# Patient Record
Sex: FEMALE | Race: OTHER | NOT HISPANIC OR LATINO | Employment: FULL TIME | URBAN - METROPOLITAN AREA
[De-identification: names, ages, dates, MRNs, and addresses within clinical notes are randomized per-mention and may not be internally consistent; named-entity substitution may affect disease eponyms.]

---

## 2017-06-13 ENCOUNTER — TRANSCRIBE ORDERS (OUTPATIENT)
Dept: ADMINISTRATIVE | Facility: HOSPITAL | Age: 72
End: 2017-06-13

## 2017-06-13 DIAGNOSIS — K86.3 CYST AND PSEUDOCYST OF PANCREAS: Primary | ICD-10-CM

## 2017-06-13 DIAGNOSIS — K86.2 CYST AND PSEUDOCYST OF PANCREAS: Primary | ICD-10-CM

## 2017-07-05 ENCOUNTER — TRANSCRIBE ORDERS (OUTPATIENT)
Dept: ADMINISTRATIVE | Age: 72
End: 2017-07-05

## 2017-07-05 ENCOUNTER — APPOINTMENT (OUTPATIENT)
Dept: LAB | Age: 72
End: 2017-07-05
Payer: COMMERCIAL

## 2017-07-05 DIAGNOSIS — K86.2 CYST AND PSEUDOCYST OF PANCREAS: ICD-10-CM

## 2017-07-05 DIAGNOSIS — K86.2 CYST AND PSEUDOCYST OF PANCREAS: Primary | ICD-10-CM

## 2017-07-05 DIAGNOSIS — K86.3 CYST AND PSEUDOCYST OF PANCREAS: ICD-10-CM

## 2017-07-05 DIAGNOSIS — K86.3 CYST AND PSEUDOCYST OF PANCREAS: Primary | ICD-10-CM

## 2017-07-05 LAB
ALBUMIN SERPL BCP-MCNC: 3.7 G/DL (ref 3.5–5)
ALP SERPL-CCNC: 83 U/L (ref 46–116)
ALT SERPL W P-5'-P-CCNC: 24 U/L (ref 12–78)
ANION GAP SERPL CALCULATED.3IONS-SCNC: 7 MMOL/L (ref 4–13)
AST SERPL W P-5'-P-CCNC: 30 U/L (ref 5–45)
BASOPHILS # BLD AUTO: 0.04 THOUSANDS/ΜL (ref 0–0.1)
BASOPHILS NFR BLD AUTO: 1 % (ref 0–1)
BILIRUB SERPL-MCNC: 0.6 MG/DL (ref 0.2–1)
BILIRUB UR QL STRIP: NEGATIVE
BUN SERPL-MCNC: 9 MG/DL (ref 5–25)
CALCIUM SERPL-MCNC: 9.1 MG/DL (ref 8.3–10.1)
CHLORIDE SERPL-SCNC: 104 MMOL/L (ref 100–108)
CLARITY UR: CLEAR
CO2 SERPL-SCNC: 27 MMOL/L (ref 21–32)
COLOR UR: YELLOW
CREAT SERPL-MCNC: 0.59 MG/DL (ref 0.6–1.3)
EOSINOPHIL # BLD AUTO: 0.08 THOUSAND/ΜL (ref 0–0.61)
EOSINOPHIL NFR BLD AUTO: 1 % (ref 0–6)
ERYTHROCYTE [DISTWIDTH] IN BLOOD BY AUTOMATED COUNT: 13.8 % (ref 11.6–15.1)
GFR SERPL CREATININE-BSD FRML MDRD: >60 ML/MIN/1.73SQ M
GLUCOSE P FAST SERPL-MCNC: 82 MG/DL (ref 65–99)
GLUCOSE UR STRIP-MCNC: NEGATIVE MG/DL
HCT VFR BLD AUTO: 37.3 % (ref 34.8–46.1)
HGB BLD-MCNC: 12.2 G/DL (ref 11.5–15.4)
HGB UR QL STRIP.AUTO: NEGATIVE
KETONES UR STRIP-MCNC: ABNORMAL MG/DL
LEUKOCYTE ESTERASE UR QL STRIP: NEGATIVE
LIPASE SERPL-CCNC: 804 U/L (ref 73–393)
LYMPHOCYTES # BLD AUTO: 1.94 THOUSANDS/ΜL (ref 0.6–4.47)
LYMPHOCYTES NFR BLD AUTO: 26 % (ref 14–44)
MCH RBC QN AUTO: 29.5 PG (ref 26.8–34.3)
MCHC RBC AUTO-ENTMCNC: 32.7 G/DL (ref 31.4–37.4)
MCV RBC AUTO: 90 FL (ref 82–98)
MONOCYTES # BLD AUTO: 0.57 THOUSAND/ΜL (ref 0.17–1.22)
MONOCYTES NFR BLD AUTO: 8 % (ref 4–12)
NEUTROPHILS # BLD AUTO: 4.83 THOUSANDS/ΜL (ref 1.85–7.62)
NEUTS SEG NFR BLD AUTO: 64 % (ref 43–75)
NITRITE UR QL STRIP: NEGATIVE
NRBC BLD AUTO-RTO: 0 /100 WBCS
PH UR STRIP.AUTO: 6 [PH] (ref 4.5–8)
PLATELET # BLD AUTO: 269 THOUSANDS/UL (ref 149–390)
PMV BLD AUTO: 10.1 FL (ref 8.9–12.7)
POTASSIUM SERPL-SCNC: 4.7 MMOL/L (ref 3.5–5.3)
PROT SERPL-MCNC: 7.2 G/DL (ref 6.4–8.2)
PROT UR STRIP-MCNC: NEGATIVE MG/DL
RBC # BLD AUTO: 4.13 MILLION/UL (ref 3.81–5.12)
SODIUM SERPL-SCNC: 138 MMOL/L (ref 136–145)
SP GR UR STRIP.AUTO: 1.01 (ref 1–1.03)
UROBILINOGEN UR QL STRIP.AUTO: 0.2 E.U./DL
WBC # BLD AUTO: 7.48 THOUSAND/UL (ref 4.31–10.16)

## 2017-07-05 PROCEDURE — 85025 COMPLETE CBC W/AUTO DIFF WBC: CPT

## 2017-07-05 PROCEDURE — 83690 ASSAY OF LIPASE: CPT

## 2017-07-05 PROCEDURE — 80053 COMPREHEN METABOLIC PANEL: CPT

## 2017-07-05 PROCEDURE — 81003 URINALYSIS AUTO W/O SCOPE: CPT | Performed by: SURGERY

## 2017-07-05 PROCEDURE — 36415 COLL VENOUS BLD VENIPUNCTURE: CPT

## 2017-07-12 ENCOUNTER — HOSPITAL ENCOUNTER (OUTPATIENT)
Dept: RADIOLOGY | Age: 72
Discharge: HOME | End: 2017-07-12
Payer: COMMERCIAL

## 2017-07-12 DIAGNOSIS — K86.3 CYST AND PSEUDOCYST OF PANCREAS: ICD-10-CM

## 2017-07-12 DIAGNOSIS — K86.2 CYST AND PSEUDOCYST OF PANCREAS: ICD-10-CM

## 2017-07-12 PROCEDURE — 74177 CT ABD & PELVIS W/CONTRAST: CPT

## 2017-07-12 RX ADMIN — IOHEXOL 100 ML: 350 INJECTION, SOLUTION INTRAVENOUS at 13:08

## 2017-07-26 ENCOUNTER — TRANSCRIBE ORDERS (OUTPATIENT)
Dept: ADMINISTRATIVE | Age: 72
End: 2017-07-26

## 2017-07-26 ENCOUNTER — APPOINTMENT (OUTPATIENT)
Dept: RADIOLOGY | Age: 72
End: 2017-07-26
Payer: COMMERCIAL

## 2017-07-26 DIAGNOSIS — T18.4XXD: ICD-10-CM

## 2017-07-26 DIAGNOSIS — T18.4XXD: Primary | ICD-10-CM

## 2017-07-26 PROCEDURE — 74000 HB X-RAY EXAM OF ABDOMEN (SINGLE ANTEROPOSTERIOR VIEW): CPT

## 2017-11-07 ENCOUNTER — TRANSCRIBE ORDERS (OUTPATIENT)
Dept: ADMINISTRATIVE | Facility: HOSPITAL | Age: 72
End: 2017-11-07

## 2017-11-07 DIAGNOSIS — Z13.820 SCREENING FOR OSTEOPOROSIS: ICD-10-CM

## 2017-11-07 DIAGNOSIS — Z12.31 ENCOUNTER FOR SCREENING MAMMOGRAM FOR MALIGNANT NEOPLASM OF BREAST: Primary | ICD-10-CM

## 2017-12-08 ENCOUNTER — GENERIC CONVERSION - ENCOUNTER (OUTPATIENT)
Dept: OTHER | Facility: OTHER | Age: 72
End: 2017-12-08

## 2017-12-08 ENCOUNTER — HOSPITAL ENCOUNTER (OUTPATIENT)
Dept: RADIOLOGY | Age: 72
Discharge: HOME | End: 2017-12-08
Payer: COMMERCIAL

## 2017-12-08 DIAGNOSIS — Z13.820 SCREENING FOR OSTEOPOROSIS: ICD-10-CM

## 2017-12-08 DIAGNOSIS — Z12.31 ENCOUNTER FOR SCREENING MAMMOGRAM FOR MALIGNANT NEOPLASM OF BREAST: ICD-10-CM

## 2017-12-08 PROCEDURE — G0202 SCR MAMMO BI INCL CAD: HCPCS

## 2017-12-08 PROCEDURE — 77080 DXA BONE DENSITY AXIAL: CPT

## 2021-02-13 ENCOUNTER — IMMUNIZATIONS (OUTPATIENT)
Dept: FAMILY MEDICINE CLINIC | Facility: HOSPITAL | Age: 76
End: 2021-02-13

## 2021-02-13 DIAGNOSIS — Z23 ENCOUNTER FOR IMMUNIZATION: Primary | ICD-10-CM

## 2021-02-13 PROCEDURE — 91300 SARS-COV-2 / COVID-19 MRNA VACCINE (PFIZER-BIONTECH) 30 MCG: CPT

## 2021-02-13 PROCEDURE — 0001A SARS-COV-2 / COVID-19 MRNA VACCINE (PFIZER-BIONTECH) 30 MCG: CPT

## 2021-03-06 ENCOUNTER — IMMUNIZATIONS (OUTPATIENT)
Dept: FAMILY MEDICINE CLINIC | Facility: HOSPITAL | Age: 76
End: 2021-03-06

## 2021-03-06 DIAGNOSIS — Z23 ENCOUNTER FOR IMMUNIZATION: Primary | ICD-10-CM

## 2021-03-06 PROCEDURE — 0002A SARS-COV-2 / COVID-19 MRNA VACCINE (PFIZER-BIONTECH) 30 MCG: CPT

## 2021-03-06 PROCEDURE — 91300 SARS-COV-2 / COVID-19 MRNA VACCINE (PFIZER-BIONTECH) 30 MCG: CPT

## 2022-01-23 ENCOUNTER — IMMUNIZATIONS (OUTPATIENT)
Dept: FAMILY MEDICINE CLINIC | Facility: HOSPITAL | Age: 77
End: 2022-01-23

## 2022-01-23 DIAGNOSIS — Z23 ENCOUNTER FOR IMMUNIZATION: Primary | ICD-10-CM

## 2022-01-23 PROCEDURE — 0001A COVID-19 PFIZER VACC 0.3 ML: CPT

## 2022-01-23 PROCEDURE — 91300 COVID-19 PFIZER VACC 0.3 ML: CPT

## 2022-03-06 ENCOUNTER — APPOINTMENT (EMERGENCY)
Dept: RADIOLOGY | Facility: HOSPITAL | Age: 77
DRG: 439 | End: 2022-03-06
Payer: COMMERCIAL

## 2022-03-06 ENCOUNTER — APPOINTMENT (EMERGENCY)
Dept: CT IMAGING | Facility: HOSPITAL | Age: 77
DRG: 439 | End: 2022-03-06
Payer: COMMERCIAL

## 2022-03-06 ENCOUNTER — APPOINTMENT (INPATIENT)
Dept: MRI IMAGING | Facility: HOSPITAL | Age: 77
DRG: 439 | End: 2022-03-06
Payer: COMMERCIAL

## 2022-03-06 ENCOUNTER — HOSPITAL ENCOUNTER (INPATIENT)
Facility: HOSPITAL | Age: 77
LOS: 2 days | Discharge: HOME | DRG: 439 | End: 2022-03-08
Attending: EMERGENCY MEDICINE | Admitting: INTERNAL MEDICINE
Payer: COMMERCIAL

## 2022-03-06 DIAGNOSIS — K80.20 GALL STONES: ICD-10-CM

## 2022-03-06 DIAGNOSIS — K85.20 ALCOHOL-INDUCED ACUTE PANCREATITIS WITHOUT INFECTION OR NECROSIS: ICD-10-CM

## 2022-03-06 DIAGNOSIS — K85.90 ACUTE PANCREATITIS: Primary | ICD-10-CM

## 2022-03-06 PROBLEM — K21.9 GASTROESOPHAGEAL REFLUX DISEASE: Status: ACTIVE | Noted: 2022-03-06

## 2022-03-06 PROBLEM — R65.10 SIRS (SYSTEMIC INFLAMMATORY RESPONSE SYNDROME) (HCC): Status: ACTIVE | Noted: 2022-03-06

## 2022-03-06 PROBLEM — I10 PRIMARY HYPERTENSION: Chronic | Status: ACTIVE | Noted: 2022-03-06

## 2022-03-06 LAB
2HR DELTA HS TROPONIN: 0 NG/L
ALBUMIN SERPL BCP-MCNC: 4 G/DL (ref 3.5–5)
ALP SERPL-CCNC: 116 U/L (ref 46–116)
ALT SERPL W P-5'-P-CCNC: 28 U/L (ref 12–78)
ANION GAP SERPL CALCULATED.3IONS-SCNC: 11 MMOL/L (ref 4–13)
APTT PPP: 23 SECONDS (ref 23–37)
AST SERPL W P-5'-P-CCNC: 46 U/L (ref 5–45)
ATRIAL RATE: 83 BPM
BASOPHILS # BLD MANUAL: 0 THOUSAND/UL (ref 0–0.1)
BASOPHILS NFR MAR MANUAL: 0 % (ref 0–1)
BILIRUB SERPL-MCNC: 0.75 MG/DL (ref 0.2–1)
BILIRUB UR QL STRIP: NEGATIVE
BUN SERPL-MCNC: 10 MG/DL (ref 5–25)
CALCIUM SERPL-MCNC: 9.2 MG/DL (ref 8.3–10.1)
CARDIAC TROPONIN I PNL SERPL HS: 2 NG/L
CARDIAC TROPONIN I PNL SERPL HS: 2 NG/L
CHLORIDE SERPL-SCNC: 101 MMOL/L (ref 100–108)
CLARITY UR: CLEAR
CO2 SERPL-SCNC: 26 MMOL/L (ref 21–32)
COLOR UR: YELLOW
CREAT SERPL-MCNC: 0.8 MG/DL (ref 0.6–1.3)
D DIMER PPP FEU-MCNC: 0.75 UG/ML FEU
EOSINOPHIL # BLD MANUAL: 0 THOUSAND/UL (ref 0–0.4)
EOSINOPHIL NFR BLD MANUAL: 0 % (ref 0–6)
ERYTHROCYTE [DISTWIDTH] IN BLOOD BY AUTOMATED COUNT: 13.3 % (ref 11.6–15.1)
GFR SERPL CREATININE-BSD FRML MDRD: 70 ML/MIN/1.73SQ M
GLUCOSE SERPL-MCNC: 140 MG/DL (ref 65–140)
GLUCOSE UR STRIP-MCNC: NEGATIVE MG/DL
HCT VFR BLD AUTO: 43.8 % (ref 34.8–46.1)
HGB BLD-MCNC: 14.9 G/DL (ref 11.5–15.4)
HGB UR QL STRIP.AUTO: NEGATIVE
INR PPP: 0.96 (ref 0.84–1.19)
KETONES UR STRIP-MCNC: ABNORMAL MG/DL
LACTATE SERPL-SCNC: 2 MMOL/L (ref 0.5–2)
LEUKOCYTE ESTERASE UR QL STRIP: NEGATIVE
LIPASE SERPL-CCNC: ABNORMAL U/L (ref 73–393)
LYMPHOCYTES # BLD AUTO: 0.5 THOUSAND/UL (ref 0.6–4.47)
LYMPHOCYTES # BLD AUTO: 3 % (ref 14–44)
MCH RBC QN AUTO: 31.8 PG (ref 26.8–34.3)
MCHC RBC AUTO-ENTMCNC: 34 G/DL (ref 31.4–37.4)
MCV RBC AUTO: 94 FL (ref 82–98)
MONOCYTES # BLD AUTO: 0.67 THOUSAND/UL (ref 0–1.22)
MONOCYTES NFR BLD: 4 % (ref 4–12)
NEUTROPHILS # BLD MANUAL: 15.58 THOUSAND/UL (ref 1.85–7.62)
NEUTS BAND NFR BLD MANUAL: 10 % (ref 0–8)
NEUTS SEG NFR BLD AUTO: 83 % (ref 43–75)
NITRITE UR QL STRIP: NEGATIVE
P AXIS: 80 DEGREES
PH UR STRIP.AUTO: 7 [PH] (ref 4.5–8)
PLATELET # BLD AUTO: 293 THOUSANDS/UL (ref 149–390)
PLATELET BLD QL SMEAR: ADEQUATE
PMV BLD AUTO: 9.4 FL (ref 8.9–12.7)
POTASSIUM SERPL-SCNC: 3.8 MMOL/L (ref 3.5–5.3)
PR INTERVAL: 144 MS
PROT SERPL-MCNC: 7.5 G/DL (ref 6.4–8.2)
PROT UR STRIP-MCNC: NEGATIVE MG/DL
PROTHROMBIN TIME: 12.8 SECONDS (ref 11.6–14.5)
QRS AXIS: 66 DEGREES
QRSD INTERVAL: 78 MS
QT INTERVAL: 362 MS
QTC INTERVAL: 425 MS
RBC # BLD AUTO: 4.68 MILLION/UL (ref 3.81–5.12)
RBC MORPH BLD: NORMAL
SODIUM SERPL-SCNC: 138 MMOL/L (ref 136–145)
SP GR UR STRIP.AUTO: 1.01 (ref 1–1.03)
T WAVE AXIS: 76 DEGREES
TSH SERPL DL<=0.05 MIU/L-ACNC: 0.91 UIU/ML (ref 0.36–3.74)
UROBILINOGEN UR QL STRIP.AUTO: 0.2 E.U./DL
VENTRICULAR RATE: 83 BPM
WBC # BLD AUTO: 16.75 THOUSAND/UL (ref 4.31–10.16)

## 2022-03-06 PROCEDURE — 96374 THER/PROPH/DIAG INJ IV PUSH: CPT

## 2022-03-06 PROCEDURE — 80053 COMPREHEN METABOLIC PANEL: CPT | Performed by: EMERGENCY MEDICINE

## 2022-03-06 PROCEDURE — 96375 TX/PRO/DX INJ NEW DRUG ADDON: CPT

## 2022-03-06 PROCEDURE — 85007 BL SMEAR W/DIFF WBC COUNT: CPT | Performed by: EMERGENCY MEDICINE

## 2022-03-06 PROCEDURE — 96361 HYDRATE IV INFUSION ADD-ON: CPT

## 2022-03-06 PROCEDURE — 85730 THROMBOPLASTIN TIME PARTIAL: CPT | Performed by: EMERGENCY MEDICINE

## 2022-03-06 PROCEDURE — 36415 COLL VENOUS BLD VENIPUNCTURE: CPT | Performed by: EMERGENCY MEDICINE

## 2022-03-06 PROCEDURE — 85379 FIBRIN DEGRADATION QUANT: CPT | Performed by: EMERGENCY MEDICINE

## 2022-03-06 PROCEDURE — 76376 3D RENDER W/INTRP POSTPROCES: CPT

## 2022-03-06 PROCEDURE — 84443 ASSAY THYROID STIM HORMONE: CPT | Performed by: INTERNAL MEDICINE

## 2022-03-06 PROCEDURE — 93010 ELECTROCARDIOGRAM REPORT: CPT | Performed by: INTERNAL MEDICINE

## 2022-03-06 PROCEDURE — 99223 1ST HOSP IP/OBS HIGH 75: CPT | Performed by: INTERNAL MEDICINE

## 2022-03-06 PROCEDURE — 71045 X-RAY EXAM CHEST 1 VIEW: CPT

## 2022-03-06 PROCEDURE — 85027 COMPLETE CBC AUTOMATED: CPT | Performed by: EMERGENCY MEDICINE

## 2022-03-06 PROCEDURE — 84484 ASSAY OF TROPONIN QUANT: CPT | Performed by: EMERGENCY MEDICINE

## 2022-03-06 PROCEDURE — G1004 CDSM NDSC: HCPCS

## 2022-03-06 PROCEDURE — 74181 MRI ABDOMEN W/O CONTRAST: CPT

## 2022-03-06 PROCEDURE — 85610 PROTHROMBIN TIME: CPT | Performed by: EMERGENCY MEDICINE

## 2022-03-06 PROCEDURE — 83690 ASSAY OF LIPASE: CPT | Performed by: EMERGENCY MEDICINE

## 2022-03-06 PROCEDURE — 83605 ASSAY OF LACTIC ACID: CPT | Performed by: EMERGENCY MEDICINE

## 2022-03-06 PROCEDURE — 81003 URINALYSIS AUTO W/O SCOPE: CPT

## 2022-03-06 PROCEDURE — 99285 EMERGENCY DEPT VISIT HI MDM: CPT | Performed by: EMERGENCY MEDICINE

## 2022-03-06 PROCEDURE — 74177 CT ABD & PELVIS W/CONTRAST: CPT

## 2022-03-06 PROCEDURE — 99285 EMERGENCY DEPT VISIT HI MDM: CPT

## 2022-03-06 PROCEDURE — 93005 ELECTROCARDIOGRAM TRACING: CPT

## 2022-03-06 RX ORDER — SENNOSIDES 8.6 MG
1 TABLET ORAL
Status: DISCONTINUED | OUTPATIENT
Start: 2022-03-06 | End: 2022-03-08 | Stop reason: HOSPADM

## 2022-03-06 RX ORDER — OXYCODONE HYDROCHLORIDE 5 MG/1
2.5 TABLET ORAL EVERY 4 HOURS PRN
Status: DISCONTINUED | OUTPATIENT
Start: 2022-03-06 | End: 2022-03-08 | Stop reason: HOSPADM

## 2022-03-06 RX ORDER — HYDROMORPHONE HCL/PF 1 MG/ML
0.2 SYRINGE (ML) INJECTION
Status: DISCONTINUED | OUTPATIENT
Start: 2022-03-06 | End: 2022-03-08 | Stop reason: HOSPADM

## 2022-03-06 RX ORDER — HYDROMORPHONE HCL/PF 1 MG/ML
0.5 SYRINGE (ML) INJECTION ONCE
Status: COMPLETED | OUTPATIENT
Start: 2022-03-06 | End: 2022-03-06

## 2022-03-06 RX ORDER — METOPROLOL TARTRATE 5 MG/5ML
5 INJECTION INTRAVENOUS EVERY 6 HOURS PRN
Status: DISCONTINUED | OUTPATIENT
Start: 2022-03-06 | End: 2022-03-08 | Stop reason: HOSPADM

## 2022-03-06 RX ORDER — SODIUM CHLORIDE 9 MG/ML
125 INJECTION, SOLUTION INTRAVENOUS CONTINUOUS
Status: CANCELLED | OUTPATIENT
Start: 2022-03-06

## 2022-03-06 RX ORDER — ONDANSETRON 2 MG/ML
4 INJECTION INTRAMUSCULAR; INTRAVENOUS EVERY 6 HOURS PRN
Status: DISCONTINUED | OUTPATIENT
Start: 2022-03-06 | End: 2022-03-08 | Stop reason: HOSPADM

## 2022-03-06 RX ORDER — CHLORAL HYDRATE 500 MG
1000 CAPSULE ORAL DAILY
Status: DISCONTINUED | OUTPATIENT
Start: 2022-03-07 | End: 2022-03-08 | Stop reason: HOSPADM

## 2022-03-06 RX ORDER — ACETAMINOPHEN 325 MG/1
650 TABLET ORAL EVERY 6 HOURS PRN
Status: DISCONTINUED | OUTPATIENT
Start: 2022-03-06 | End: 2022-03-08 | Stop reason: HOSPADM

## 2022-03-06 RX ORDER — SODIUM CHLORIDE, SODIUM LACTATE, POTASSIUM CHLORIDE, CALCIUM CHLORIDE 600; 310; 30; 20 MG/100ML; MG/100ML; MG/100ML; MG/100ML
75 INJECTION, SOLUTION INTRAVENOUS CONTINUOUS
Status: DISCONTINUED | OUTPATIENT
Start: 2022-03-06 | End: 2022-03-08

## 2022-03-06 RX ORDER — LORAZEPAM 2 MG/ML
0.5 INJECTION INTRAMUSCULAR ONCE AS NEEDED
Status: COMPLETED | OUTPATIENT
Start: 2022-03-06 | End: 2022-03-06

## 2022-03-06 RX ORDER — PANTOPRAZOLE SODIUM 40 MG/1
40 TABLET, DELAYED RELEASE ORAL
Status: DISCONTINUED | OUTPATIENT
Start: 2022-03-06 | End: 2022-03-08 | Stop reason: HOSPADM

## 2022-03-06 RX ORDER — OXYCODONE HYDROCHLORIDE 5 MG/1
5 TABLET ORAL EVERY 4 HOURS PRN
Status: DISCONTINUED | OUTPATIENT
Start: 2022-03-06 | End: 2022-03-08 | Stop reason: HOSPADM

## 2022-03-06 RX ORDER — ONDANSETRON 2 MG/ML
4 INJECTION INTRAMUSCULAR; INTRAVENOUS ONCE
Status: COMPLETED | OUTPATIENT
Start: 2022-03-06 | End: 2022-03-06

## 2022-03-06 RX ADMIN — SODIUM CHLORIDE, SODIUM LACTATE, POTASSIUM CHLORIDE, AND CALCIUM CHLORIDE 150 ML/HR: .6; .31; .03; .02 INJECTION, SOLUTION INTRAVENOUS at 13:18

## 2022-03-06 RX ADMIN — SODIUM CHLORIDE, SODIUM LACTATE, POTASSIUM CHLORIDE, AND CALCIUM CHLORIDE 150 ML/HR: .6; .31; .03; .02 INJECTION, SOLUTION INTRAVENOUS at 16:51

## 2022-03-06 RX ADMIN — IOHEXOL 85 ML: 350 INJECTION, SOLUTION INTRAVENOUS at 09:01

## 2022-03-06 RX ADMIN — HYDROMORPHONE HYDROCHLORIDE 0.5 MG: 1 INJECTION, SOLUTION INTRAMUSCULAR; INTRAVENOUS; SUBCUTANEOUS at 07:45

## 2022-03-06 RX ADMIN — PANTOPRAZOLE SODIUM 40 MG: 40 TABLET, DELAYED RELEASE ORAL at 13:27

## 2022-03-06 RX ADMIN — SODIUM CHLORIDE, SODIUM LACTATE, POTASSIUM CHLORIDE, AND CALCIUM CHLORIDE 150 ML/HR: .6; .31; .03; .02 INJECTION, SOLUTION INTRAVENOUS at 23:41

## 2022-03-06 RX ADMIN — SODIUM CHLORIDE 1000 ML: 0.9 INJECTION, SOLUTION INTRAVENOUS at 07:44

## 2022-03-06 RX ADMIN — LORAZEPAM 0.5 MG: 2 INJECTION INTRAMUSCULAR; INTRAVENOUS at 15:00

## 2022-03-06 RX ADMIN — ONDANSETRON 4 MG: 2 INJECTION INTRAMUSCULAR; INTRAVENOUS at 07:48

## 2022-03-06 RX ADMIN — ENOXAPARIN SODIUM 40 MG: 40 INJECTION SUBCUTANEOUS at 13:27

## 2022-03-06 NOTE — LETTER
[address] [address]  Dept: [phone]    March 13, 2022     Patient: Pradeep Saldaña   YOB: 1945   Date of Visit: 3/6/2022       To Whom it May Concern:    Pradeep Saldaña is under my professional care  She was seen in the hospital from 3/6/2022   to 03/08/22  She may return to school on 3/11/2022 without limitations  If you have any questions or concerns, please don't hesitate to call           Sincerely,          Iris Barraza, DO

## 2022-03-06 NOTE — ASSESSMENT & PLAN NOTE
· Has history of essential hypertension, however, not on any medications anymore as she claims her blood pressures were constantly normal, since she had bouts of pancreatitis in the past   · Recently, she told me, that her systolic blood pressures at home have been running high at times in the 170s  When I saw the patient, her blood pressure is normal   · Possible fluctuations in patient's blood pressures today due to patient's pain and some anxiety  · Patient used to take lisinopril with hydrochlorothiazide in the past   · For now, we will have the patient IV Lopressor p r n   · If blood pressures are persistently high, consider maintaining the patient on oral blood pressure medication

## 2022-03-06 NOTE — ASSESSMENT & PLAN NOTE
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, patient eventually underwent cysto gastrostomy that same month  In October 2017, patient again had acute pancreatitis, etiology unspecified with no infection or necrosis at that time  Patient eventually underwent an endoscopic ultrasound which revealed pancreatic cyst, suggestive of pseudocyst, no necrosis, no gallbladder stones, no bile duct stones or masses with normal pancreatic duct  The above history were found at Care [facility] under the [facility]  It is also important to note that in 1 of the notes of her doctors, there was a diagnosis also of chronic pancreatitis  · CT scan done today revealed acute interstitial pancreatitis without evidence of necrosis or significant peripancreatic fluid collection  Patient was also found to have distended gallbladder with potential tiny noncalcified gallstone  There is a mild dilatation of the proximal CBD at 9 mm  No radiopaque choledocholithiasis  Findings may be due to spasm at the ampulla or periampullary edema  Consider MRI/MRCP if clinically warranted  · IV fluid hydration  From my discussion with the patient and from my review of patient's previous record, when patient had the pancreatitis in 2016, patient developed IV fluid induced CHF  Thus for a time patient was on Lasix    Echocardiogram at that time was essentially normal except for a mild diastolic dysfunction, mild pulmonary hypertension, mild tricuspid valve and mitral valve regurgitation  According to the patient, her primary care physician, Dr Viktor Ortiz, told her, that she the did have CHF and that the congestion at that time just happened with the aggressive IV fluids  Thus patient was eventually taken off from diuretic medications  Thus, from my discussion with the patient, she is okay for now with a rate of 100 mL/hour as far as her IV fluid rate is concerned  She is okay adjusting this accordingly, particularly if her pancreatitis is not improving/worsening  · Pain control  · GI consult  · Likely will need MRI/MRCP  But I will leave this decision to the GI doctor  Patient's right shoulder/scapular pain, possible reflex pain from gallbladder stone  · NPO, but patient requested to have ice chips/sips of water/clear liquid, thus this will be provided  · Discussed with the patient importance of alcohol cessation as she has history of recurrent pancreatitis already  · Recheck serum lipase, CMP, CBC tomorrow

## 2022-03-06 NOTE — H&P
[facility]  H&P- [address] [facility] 1945, 76 y o  female MRN: [id number]  Unit/Bed#: ED 14 Encounter: [id number]  Primary Care Provider: Mick Shane MD   Date and time admitted to hospital: 3/6/2022  6:25 AM    Addendum note:  Spoke to GI doctor, Dr Vickie Morel, about this case, patient's pancreatitis likely more due to cholelithiasis, rule out CBD/bile duct stones  He recommends getting an MRI/MRCP  He also recommends increasing the rate of the IV fluids  I spoke to the patient about this and she is okay to have it increased to 150 mL/hour  Patient is hesitant regarding aggressive IV fluid hydration, due to previous history of IV fluid induced pulmonary congestion  Patient was instructed to inform staff if she develops signs and symptoms of congestion/volume overload  Patient is a nurse by profession  Dr Vickie Morel also told me, patient likely will eventually need surgery consult for possibility of cholecystectomy but will wait for the pancreatitis to improve and results of the MRI/MRCP 1st       * 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  Assessment & Plan  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, patient eventually underwent cysto gastrostomy that same month  In October 2017, patient again had acute pancreatitis, etiology unspecified with no infection or necrosis at that time    Patient eventually underwent an endoscopic ultrasound which revealed pancreatic cyst, suggestive of pseudocyst, no necrosis, no gallbladder stones, no bile duct stones or masses with normal pancreatic duct  The above history were found at Care [facility] under the [facility]  It is also important to note that in 1 of the notes of her doctors, there was a diagnosis also of chronic pancreatitis  · CT scan done today revealed acute interstitial pancreatitis without evidence of necrosis or significant peripancreatic fluid collection  Patient was also found to have distended gallbladder with potential tiny noncalcified gallstone  There is a mild dilatation of the proximal CBD at 9 mm  No radiopaque choledocholithiasis  Findings may be due to spasm at the ampulla or periampullary edema  Consider MRI/MRCP if clinically warranted  · IV fluid hydration  From my discussion with the patient and from my review of patient's previous record, when patient had the pancreatitis in 2016, patient developed IV fluid induced CHF  Thus for a time patient was on Lasix  Echocardiogram at that time was essentially normal except for a mild diastolic dysfunction, mild pulmonary hypertension, mild tricuspid valve and mitral valve regurgitation  According to the patient, her primary care physician, Dr Carl Day, told her, that she the did have CHF and that the congestion at that time just happened with the aggressive IV fluids  Thus patient was eventually taken off from diuretic medications  Thus, from my discussion with the patient, she is okay for now with a rate of 100 mL/hour as far as her IV fluid rate is concerned  She is okay adjusting this accordingly, particularly if her pancreatitis is not improving/worsening  · Pain control  · GI consult  · Likely will need MRI/MRCP  But I will leave this decision to the GI doctor  Patient's right shoulder/scapular pain, possible reflex pain from gallbladder stone    · NPO, but patient requested to have ice chips/sips of water/clear liquid, thus this will be provided  · Discussed with the patient importance of alcohol cessation as she has history of recurrent pancreatitis already  · Recheck serum lipase, CMP, CBC tomorrow  SIRS (systemic inflammatory response syndrome) (HCC)  Assessment & Plan  · Present on admission with leukocytosis and mild tachycardia  · Likely cause:  Acute pancreatitis  · Monitor for any development of an acute infection  · Monitor CBC with differential count  Monitor vital signs  · Please see plans under acute pancreatitis  Gastroesophageal reflux disease  Assessment & Plan  · In the past, patient was prescribed with proton pump inhibitor, however, patient discontinued this as she read that it can cause low magnesium  · CT scan of the abdomen done today also revealed a small sliding hiatal hernia and fluid in the distal esophagus attributed to gastroesophageal reflux  · From my discussion with the patient, she is okay to start with PPI  · Will check for a baseline level of magnesium (patient is concerned about this)  Monitor and replace if low  Primary hypertension  Assessment & Plan  · Has history of essential hypertension, however, not on any medications anymore as she claims her blood pressures were constantly normal, since she had bouts of pancreatitis in the past   · Recently, she told me, that her systolic blood pressures at home have been running high at times in the 170s  When I saw the patient, her blood pressure is normal   · Possible fluctuations in patient's blood pressures today due to patient's pain and some anxiety  · Patient used to take lisinopril with hydrochlorothiazide in the past   · For now, we will have the patient IV Lopressor p r n   · If blood pressures are persistently high, consider maintaining the patient on oral blood pressure medication      VTE Pharmacologic Prophylaxis: VTE Score: 3 Moderate Risk (Score 3-4) - Pharmacological DVT Prophylaxis Ordered: enoxaparin (Lovenox)  Code Status:  Full code  Discussion with family: Patient declined call to   Anticipated Length of Stay: Patient will be admitted on an inpatient basis with an anticipated length of stay of greater than 2 midnights secondary to Above findings and plans       Total Time for Visit, including Counseling / Coordination of Care: 70 minutes Greater than 50% of this total time spent on direct patient counseling and coordination of care  Chief Complaint:  Initially right upper back pain and then abdominal pain    History of Present Illness:  Bartolo Tate is a 76 y o  female with a PMH significant for bout of pancreatitis, hypertension, cysto gastrostomy procedure, IV fluid induced pulmonary congestion, who presents with complaints of initially having right upper back pain and then developing abdominal pain  According to the patient, when she woke up at 4 a m , 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, she fell pains on her right upper back area  The pain is localized at the right scapular/shoulder area  She then had episodes of nausea and vomiting  She told me she vomited 3 times  She vomited gastric fluid  At that time, patient has not eaten any breakfast yet  Presently, patient's vomiting is relieved with giving of Zofran but still has some occasional nausea  Eventually, patient's right upper back pain had resolved, however, patient developed abdominal pains  Pain was localized at the epigastric and right upper quadrant areas  Pain is dull, with no radiation  When asked, patient denies any chest pains, but admitted to some shortness of breath when she had the right upper back pain  At present, patient is not short of breath with good oxygen saturations in the emergency room on room air  Patient denies any other symptoms other the ones mentioned above  Patient admitted that last night, she drank 3 glasses of wine    From our discussion, she has an occasional/social alcoholic beverage drinker and does not drink alcohol on a regular basis  Review of Systems:  Review of Systems    Past Medical and Surgical History:   Past Medical History:   Diagnosis Date    Basal cell carcinoma     back    Hypertension     no medications currently    Pancreatitis        Past Surgical History:   Procedure Laterality Date    TUBAL LIGATION      WISDOM TOOTH EXTRACTION         Meds/Allergies:  Prior to Admission medications    Medication Sig Start Date End Date Taking? Authorizing Provider   mometasone (ELOCON) 0 1 % lotion One drop affected ear canal daily at bedtime when necessary itching 3/25/19  Yes Dax Carrasquillo MD   esomeprazole (NexIUM) 40 MG capsule Take 40 mg by mouth every morning before breakfast    Historical Provider, MD   fluocinolone acetonide (DERMOTIC) 0 01 % otic oil Administer 2 drops into both ears daily at bedtime as needed (when itchy) for up to 180 days 3/19/19 9/15/19  Dax Carrasquillo MD   Omega-3 Fatty Acids (FISH OIL PO) Take 1 g by mouth    Historical Provider, MD   omeprazole (PriLOSEC) 20 mg delayed release capsule Take 20 mg by mouth daily    Historical Provider, MD     I reviewed patient's home medications with the patient herself and through epic  From my discussion with her, she is not on any blood pressure medications anymore and not on any Nexium or omeprazole anymore        Allergies: Allergies   Allergen Reactions    Pollen Extract Other (See Comments)     Runny nose & congestion       Social History:  Marital Status: /Civil Union   Occupation:  Nurse  Patient Pre-hospital Living Situation: Home  Patient Pre-hospital Level of Mobility: walks  Patient Pre-hospital Diet Restrictions:  None    Substance Use History:   Social History     Substance and Sexual Activity   Alcohol Use Yes    Comment: rarely     Social History     Tobacco Use   Smoking Status Former Smoker    Types: Cigarettes    Quit date: 2008    Years since quittin 1   Smokeless Tobacco Never Used     Social History     Substance and Sexual Activity   Drug Use Not on file       Family History:  Family History   Problem Relation Age of Onset    Lung cancer Mother     Heart attack Father        Physical Exam:     Vitals:   Blood Pressure: 132/77 (22 1200)  Pulse: (!) 108 (22 1200)  Temperature: 98 4 °F (36 9 °C) (22)  Temp Source: Oral (22)  Respirations: 16 (22)  Height: 5' 2" (157 5 cm) (22)  Weight - Scale: 47 4 kg (104 lb 8 oz) (22)  SpO2: 95 % (22)    Physical Exam  Vitals and nursing note reviewed  Constitutional:       General: She is not in acute distress  Appearance: She is not ill-appearing, toxic-appearing or diaphoretic  HENT:      Head: Normocephalic and atraumatic  Mouth/Throat:      Mouth: Mucous membranes are dry  Pharynx: Oropharynx is clear  No oropharyngeal exudate or posterior oropharyngeal erythema  Eyes:      General: No scleral icterus  Right eye: No discharge  Left eye: No discharge  Cardiovascular:      Rate and Rhythm: Normal rate and regular rhythm  Heart sounds: Normal heart sounds  No murmur heard  No friction rub  No gallop  Pulmonary:      Effort: Pulmonary effort is normal  No respiratory distress  Breath sounds: Normal breath sounds  No stridor  No wheezing, rhonchi or rales  Abdominal:      General: Bowel sounds are normal  There is no distension  Palpations: Abdomen is soft  Tenderness: There is abdominal tenderness  There is no guarding or rebound  Comments: Positive for direct tenderness at the epigastric and right upper quadrant areas  Musculoskeletal:         General: No tenderness  Right lower leg: No edema  Left lower leg: No edema  Comments: No tenderness at the right upper back area, were she had the initial pain     Skin:     General: Skin is warm and dry  Coloration: Skin is not pale  Findings: No bruising, erythema, lesion or rash  Neurological:      General: No focal deficit present  Mental Status: She is alert and oriented to person, place, and time  Psychiatric:         Behavior: Behavior normal          Thought Content: Thought content normal       Comments: Slightly anxious  Additional Data:     Lab Results:  Results from last 7 days   Lab Units 03/06/22  0732   WBC Thousand/uL 16 75*   HEMOGLOBIN g/dL 14 9   HEMATOCRIT % 43 8   PLATELETS Thousands/uL 293   BANDS PCT % 10*   LYMPHO PCT % 3*   MONO PCT % 4   EOS PCT % 0     Results from last 7 days   Lab Units 03/06/22  0732   SODIUM mmol/L 138   POTASSIUM mmol/L 3 8   CHLORIDE mmol/L 101   CO2 mmol/L 26   BUN mg/dL 10   CREATININE mg/dL 0 80   ANION GAP mmol/L 11   CALCIUM mg/dL 9 2   ALBUMIN g/dL 4 0   TOTAL BILIRUBIN mg/dL 0 75   ALK PHOS U/L 116   ALT U/L 28   AST U/L 46*   GLUCOSE RANDOM mg/dL 140     Results from last 7 days   Lab Units 03/06/22  0732   INR  0 96             Results from last 7 days   Lab Units 03/06/22  0732   LACTIC ACID mmol/L 2 0       Imaging: Reviewed radiology reports from this admission including: chest xray and abdominal/pelvic CT  CT abdomen pelvis with contrast   Final Result by Loyda Ruiz MD (03/06 1111)      Acute interstitial pancreatitis  No evidence of acute pancreatic necrosis or significant peripancreatic fluid collection  Distended gallbladder with potential tiny noncalcified gallstone  Mild dilation of the proximal CBD and 9 mm tapering normally towards the pancreas  No radiopaque choledocholithiasis  Findings may be due to spasm at the ampulla or periampullary edema  Consider follow-up with MRI/MRCP if clinically warranted  Small sliding hiatal hernia  Fluid in the distal esophagus attributed to gastroesophageal reflux        This study demonstrates a significant  finding and was documented as such in [facility] for liaison and referring practitioner notification  Workstation performed: [id number]         XR chest 1 view portable   ED Interpretation by Elva Lefort, MD (03/06 7511)   Unremarkable cardiac silhouette  Clear lungs      Final Result by Gasper Moreau MD (03/06 0998)      No acute cardiopulmonary disease  Workstation performed: [id number]             EKG and Other Studies Reviewed on Admission:   · EKG: NSR  HR Eighty-three per minute  ** Please Note: This note has been constructed using a voice recognition system   **

## 2022-03-06 NOTE — ASSESSMENT & PLAN NOTE
· Present on admission with leukocytosis and mild tachycardia  · Likely cause:  Acute pancreatitis  · Monitor for any development of an acute infection  · Monitor CBC with differential count  Monitor vital signs  · Please see plans under acute pancreatitis

## 2022-03-06 NOTE — ASSESSMENT & PLAN NOTE
· In the past, patient was prescribed with proton pump inhibitor, however, patient discontinued this as she read that it can cause low magnesium  · CT scan of the abdomen done today also revealed a small sliding hiatal hernia and fluid in the distal esophagus attributed to gastroesophageal reflux  · From my discussion with the patient, she is okay to start with PPI  · Will check for a baseline level of magnesium (patient is concerned about this)  Monitor and replace if low

## 2022-03-06 NOTE — ED PROVIDER NOTES
History  Chief Complaint   Patient presents with    Back Pain     patient reports upper right back pain begining around 4am that comes and goes  denies injury to site +Nausea -CP     49-year-old female presents to the emergency department relating that she is having severe pain and nausea   She gestures to the right posterior thoracic region and over to the left posterior thoracic region describing location of pain  She states that it is stabbing    It is pleuritic  Onset at 04:00 today  She felt well yesterday  She has noticed some alternating heat flashes and chills today though does not believe she has had a fever  She has not had any cough nor hemoptysis  No recalled similar symptoms  No known sick contacts or bad food ingestions  Past medical history significant for hypertension  She relates that she was treated with lisinopril remotely but that her pressures improved in this was discontinued  She did have pancreatitis on 1 occasion in the past and relates that physicians at the hospital suggested she might also have congestive heart failure  Upon follow-up with her PCP was not felt that she had congestive heart failure  Discomfort with pancreatitis felt different than that today  Her episode of pancreatitis was attributed to alcohol consumption  She notes that she did not consume alcohol for years although recently has had some intermittent alcohol consumption  Last night she did have 3 glasses of wine  She did not have any the day before  Prior to Admission Medications   Prescriptions Last Dose Informant Patient Reported? Taking?    Omega-3 Fatty Acids (FISH OIL PO) More than a month at Unknown time Self Yes No   Sig: Take 1 g by mouth   esomeprazole (NexIUM) 40 MG capsule  Self Yes No   Sig: Take 40 mg by mouth every morning before breakfast   fluocinolone acetonide (DERMOTIC) 0 01 % otic oil   No No   Sig: Administer 2 drops into both ears daily at bedtime as needed (when itchy) for up to 180 days   mometasone (ELOCON) 0 1 % lotion Past Month at Unknown time  No Yes   Sig: One drop affected ear canal daily at bedtime when necessary itching   omeprazole (PriLOSEC) 20 mg delayed release capsule More than a month at Unknown time Self Yes No   Sig: Take 20 mg by mouth daily      Facility-Administered Medications: None       Past Medical History:   Diagnosis Date    Basal cell carcinoma     back    Hypertension     no medications currently    Pancreatitis        Past Surgical History:   Procedure Laterality Date    TUBAL LIGATION      WISDOM TOOTH EXTRACTION         Family History   Problem Relation Age of Onset    Lung cancer Mother     Heart attack Father      I have reviewed and agree with the history as documented  E-Cigarette/Vaping     E-Cigarette/Vaping Substances     Social History     Tobacco Use    Smoking status: Former Smoker     Types: Cigarettes     Quit date: 2008     Years since quittin 1    Smokeless tobacco: Never Used   Substance Use Topics    Alcohol use: Yes     Comment: rarely    Drug use: Not on file       Review of Systems   All other systems reviewed and are negative  Physical Exam  Physical Exam  Vitals and nursing note reviewed  Constitutional:       General: She is in acute distress ( intermittent dry heaves clutching emesis bag)  Appearance: Normal appearance  She is ill-appearing  HENT:      Head: Normocephalic  Mouth/Throat:      Mouth: Mucous membranes are moist    Eyes:      General: No scleral icterus  Extraocular Movements: Extraocular movements intact  Conjunctiva/sclera: Conjunctivae normal    Cardiovascular:      Rate and Rhythm: Normal rate and regular rhythm  Pulmonary:      Effort: Pulmonary effort is normal       Breath sounds: Normal breath sounds  Chest:      Chest wall: Tenderness (Focal area of tenderness just right of midline low thoracic region  No overlying skin change) present     Abdominal: General: Bowel sounds are normal       Palpations: Abdomen is soft  Tenderness: There is abdominal tenderness ( exquisite upper abdominal and mild left lower quadrant)  There is guarding (Upper abdomen)  There is no right CVA tenderness, left CVA tenderness or rebound  Musculoskeletal:         General: No tenderness  Normal range of motion  Cervical back: Neck supple  Right lower leg: No edema  Left lower leg: No edema  Skin:     General: Skin is warm and dry  Neurological:      General: No focal deficit present  Mental Status: She is alert and oriented to person, place, and time     Psychiatric:         Mood and Affect: Mood normal          Vital Signs  ED Triage Vitals   Temperature Pulse Respirations Blood Pressure SpO2   03/06/22 0645 03/06/22 0626 03/06/22 0626 03/06/22 0626 03/06/22 0626   98 4 °F (36 9 °C) 87 18 161/74 100 %      Temp Source Heart Rate Source Patient Position - Orthostatic VS BP Location FiO2 (%)   03/06/22 0645 03/06/22 0626 03/06/22 0626 03/06/22 0626 --   Oral Monitor Sitting Right arm       Pain Score       03/06/22 0626       10 - Worst Possible Pain           Vitals:    03/06/22 1100 03/06/22 1130 03/06/22 1200 03/06/22 1300   BP: 120/72 131/63 132/77 130/79   Pulse: 102 102 (!) 108 96   Patient Position - Orthostatic VS: Lying Lying Lying Lying         Visual Acuity  Visual Acuity      Most Recent Value   L Pupil Size (mm) 2   R Pupil Size (mm) 2          ED Medications  Medications   pantoprazole (PROTONIX) EC tablet 40 mg (40 mg Oral Given 3/6/22 1327)   fish oil capsule 1,000 mg (has no administration in time range)   lactated ringers infusion (150 mL/hr Intravenous New Bag 3/6/22 1318)   acetaminophen (TYLENOL) tablet 650 mg (has no administration in time range)   senna (SENOKOT) tablet 8 6 mg (has no administration in time range)   ondansetron (ZOFRAN) injection 4 mg (has no administration in time range)   enoxaparin (LOVENOX) subcutaneous injection 40 mg (40 mg Subcutaneous Given 3/6/22 1327)   oxyCODONE (ROXICODONE) IR tablet 2 5 mg (has no administration in time range)   oxyCODONE (ROXICODONE) IR tablet 5 mg (has no administration in time range)   HYDROmorphone (DILAUDID) injection 0 2 mg (has no administration in time range)   metoprolol (LOPRESSOR) injection 5 mg (has no administration in time range)   LORazepam (ATIVAN) injection 0 5 mg (has no administration in time range)   sodium chloride 0 9 % bolus 1,000 mL (0 mL Intravenous Stopped 3/6/22 1025)   HYDROmorphone (DILAUDID) injection 0 5 mg (0 5 mg Intravenous Given 3/6/22 0745)   ondansetron (ZOFRAN) injection 4 mg (4 mg Intravenous Given 3/6/22 0748)   iohexol (OMNIPAQUE) 350 MG/ML injection (SINGLE-DOSE) 85 mL (85 mL Intravenous Given 3/6/22 0901)       Diagnostic Studies  Results Reviewed     Procedure Component Value Units Date/Time    TSH, 3rd generation with Free T4 reflex [[id number]] Collected: 03/06/22 0732    Lab Status:  In process Specimen: Blood from Arm, Left Updated: 03/06/22 1323    Urine Macroscopic, POC [[id number]]  (Abnormal) Collected: 03/06/22 1040    Lab Status: Final result Specimen: Urine Updated: 03/06/22 1041     Color, UA Yellow     Clarity, UA Clear     pH, UA 7 0     Leukocytes, UA Negative     Nitrite, UA Negative     Protein, UA Negative mg/dl      Glucose, UA Negative mg/dl      Ketones, UA Trace mg/dl      Urobilinogen, UA 0 2 E U /dl      Bilirubin, UA Negative     Blood, UA Negative     Specific Gravity, UA 1 015    Narrative:      CLINITEK RESULT    HS Troponin I 2hr [[id number]]  (Normal) Collected: 03/06/22 0920    Lab Status: Final result Specimen: Blood from Arm, Left Updated: 03/06/22 1015     hs TnI 2hr 2 ng/L      Delta 2hr hsTnI 0 ng/L     Lipase [[id number]]  (Abnormal) Collected: 03/06/22 0732    Lab Status: Final result Specimen: Blood from Arm, Left Updated: 03/06/22 0856     Lipase 29,464 u/L     D-Dimer [[id number]]  (Abnormal) Collected: 03/06/22 [id number]    Lab Status: Final result Specimen: Blood from Arm, Left Updated: 03/06/22 0836     D-Dimer, Quant 0 75 ug/ml FEU     Narrative: In the evaluation for possible pulmonary embolism, in the appropriate (Well's Score of 4 or less) patient, the age adjusted d-dimer cutoff for this patient can be calculated as:    Age x 0 01 (in ug/mL) for Age-adjusted D-dimer exclusion threshold for a patient over 50 years  HS Troponin 0hr (reflex protocol) [[id number]]  (Normal) Collected: 03/06/22 0732    Lab Status: Final result Specimen: Blood from Arm, Left Updated: 03/06/22 0827     hs TnI 0hr 2 ng/L     CBC and differential [[id number]]  (Abnormal) Collected: 03/06/22 0732    Lab Status: Final result Specimen: Blood from Arm, Left Updated: 03/06/22 0815     WBC 16 75 Thousand/uL      RBC 4 68 Million/uL      Hemoglobin 14 9 g/dL      Hematocrit 43 8 %      MCV 94 fL      MCH 31 8 pg      MCHC 34 0 g/dL      RDW 13 3 %      MPV 9 4 fL      Platelets 018 Thousands/uL     Narrative: This is an appended report  These results have been appended to a previously verified report      Manual Differential(PHLEBS Do Not Order) [[id number]]  (Abnormal) Collected: 03/06/22 0732    Lab Status: Final result Specimen: Blood from Arm, Left Updated: 03/06/22 0815     Segmented % 83 %      Bands % 10 %      Lymphocytes % 3 %      Monocytes % 4 %      Eosinophils, % 0 %      Basophils % 0 %      Absolute Neutrophils 15 58 Thousand/uL      Lymphocytes Absolute 0 50 Thousand/uL      Monocytes Absolute 0 67 Thousand/uL      Eosinophils Absolute 0 00 Thousand/uL      Basophils Absolute 0 00 Thousand/uL      Total Counted --     RBC Morphology Normal     Platelet Estimate Adequate    Protime-INR [[id number]]  (Normal) Collected: 03/06/22 0732    Lab Status: Final result Specimen: Blood from Arm, Left Updated: 03/06/22 0809     Protime 12 8 seconds      INR 0 96    APTT [[id number]]  (Normal) Collected: 03/06/22 0732    Lab Status: Final result Specimen: Blood from Arm, Left Updated: 03/06/22 0809     PTT 23 seconds     Lactic acid [[id number]]  (Normal) Collected: 03/06/22 0732    Lab Status: Final result Specimen: Blood from Arm, Left Updated: 03/06/22 0956     LACTIC ACID 2 0 mmol/L     Narrative:      Result may be elevated if tourniquet was used during collection  Comprehensive metabolic panel [[id number]]  (Abnormal) Collected: 03/06/22 0732    Lab Status: Final result Specimen: Blood from Arm, Left Updated: 03/06/22 0805     Sodium 138 mmol/L      Potassium 3 8 mmol/L      Chloride 101 mmol/L      CO2 26 mmol/L      ANION GAP 11 mmol/L      BUN 10 mg/dL      Creatinine 0 80 mg/dL      Glucose 140 mg/dL      Calcium 9 2 mg/dL      AST 46 U/L      ALT 28 U/L      Alkaline Phosphatase 116 U/L      Total Protein 7 5 g/dL      Albumin 4 0 g/dL      Total Bilirubin 0 75 mg/dL      eGFR 70 ml/min/1 73sq m     Narrative:      [place] guidelines for Chronic Kidney Disease (CKD):     Stage 1 with normal or high GFR (GFR > 90 mL/min/1 73 square meters)    Stage 2 Mild CKD (GFR = 60-89 mL/min/1 73 square meters)    Stage 3A Moderate CKD (GFR = 45-59 mL/min/1 73 square meters)    Stage 3B Moderate CKD (GFR = 30-44 mL/min/1 73 square meters)    Stage 4 Severe CKD (GFR = 15-29 mL/min/1 73 square meters)    Stage 5 End Stage CKD (GFR <15 mL/min/1 73 square meters)  Note: GFR calculation is accurate only with a steady state creatinine                 CT abdomen pelvis with contrast   Final Result by Laura Emery MD (03/06 1111)      Acute interstitial pancreatitis  No evidence of acute pancreatic necrosis or significant peripancreatic fluid collection  Distended gallbladder with potential tiny noncalcified gallstone  Mild dilation of the proximal CBD and 9 mm tapering normally towards the pancreas  No radiopaque choledocholithiasis  Findings may be due to spasm at the ampulla or periampullary edema  Consider follow-up with MRI/MRCP if clinically warranted  Small sliding hiatal hernia  Fluid in the distal esophagus attributed to gastroesophageal reflux  This study demonstrates a significant  finding and was documented as such in [facility] for liaison and referring practitioner notification  Workstation performed: [id number]         XR chest 1 view portable   ED Interpretation by Smita Guzman MD (03/06 5124)   Unremarkable cardiac silhouette  Clear lungs      Final Result by Karl Aquino MD (03/06 0917)      No acute cardiopulmonary disease  Workstation performed: [id number]         MRI inpatient order    (Results Pending)              Procedures  ECG 12 Lead Documentation Only    Date/Time: 3/6/2022 8:05 AM  Performed by: Smita Guzman MD  Authorized by: Smita Guzman MD     ECG reviewed by me, the ED Provider: yes    Patient location:  ED  Previous ECG:     Previous ECG:  Unavailable  Rate:     ECG rate:  83    ECG rate assessment: normal    Rhythm:     Rhythm: sinus rhythm    Ectopy:     Ectopy: none    QRS:     QRS axis:  Normal    QRS intervals:  Normal  Conduction:     Conduction: normal    ST segments:     ST segments:  Normal  T waves:     T waves: inverted      Inverted:  AVL             ED Course  ED Course as of 03/06/22 1336   Sun Mar 06, 2022   0716 Differential diagnosis includes but is not limited to recurrence of pancreatitis, biliary colic, PE, ACS, pneumonia &  gastritis/ PUD  2412 D-dimer has returned  Within age adjusted cutoff this is normal   Leukocytosis with bandemia is present  Chemistry unremarkable with exception of very slightly elevated AST  Lipase is pending  Obtaining CT abdomen pelvis   0910 Lipase has returned quite elevated  Patient just returned from CT scan  Nausea has resolved  She relates that pain is now "tolerable  "It is no longer as sharp as it had been    She declines additional analgesic for now  She relates that the discomfort is starting to become more prominent in the abdomen as opposed to the back where she initially appreciated it  Anticipated admission  Await radiology interpretation CT images  Kwabena Chen' Criteria for PE      Most Recent Value   Wells' Criteria for PE    Clinical signs and symptoms of DVT 0 Filed at: 03/06/2022 0844   PE is primary diagnosis or equally likely 0 Filed at: 03/06/2022 0844   HR >100 1 5 Filed at: 03/06/2022 0844   Immobilization at least 3 days or Surgery in the previous 4 weeks 0 Filed at: 03/06/2022 0844   Previous, objectively diagnosed PE or DVT 0 Filed at: 03/06/2022 0844   Hemoptysis 0 Filed at: 03/06/2022 1896   Malignancy with treatment within 6 months or palliative 0 Filed at: 03/06/2022 9415   Wells' Criteria Total 1 5 Filed at: 03/06/2022 2716                MDM    Disposition  Final diagnoses:   Acute pancreatitis     Time reflects when diagnosis was documented in both MDM as applicable and the Disposition within this note     Time User Action Codes Description Comment    3/6/2022 11:49 AM William HARO Add [K85 90] Acute pancreatitis     3/6/2022 12:19 PM Kumar Damon Add [K85 20] 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       ED Disposition     ED Disposition Condition Date/Time Comment    Admit Stable Sun Mar 6, 2022 11:49 AM Case was discussed with Dr Bernardino Beckman and the patient's admission status was agreed to be Admission Status: inpatient status to the service of Dr Bernardino Beckman   Follow-up Information    None         Patient's Medications   Discharge Prescriptions    No medications on file       No discharge procedures on file      PDMP Review       Value Time User    PDMP Reviewed  Yes 3/6/2022 12:17 PM Carry Gabriela Damon MD          ED Provider  Electronically Signed by           Raciel Egan MD  03/06/22 7002

## 2022-03-07 LAB
ALBUMIN SERPL BCP-MCNC: 2.4 G/DL (ref 3.5–5)
ALP SERPL-CCNC: 73 U/L (ref 46–116)
ALT SERPL W P-5'-P-CCNC: 38 U/L (ref 12–78)
ANION GAP SERPL CALCULATED.3IONS-SCNC: 8 MMOL/L (ref 4–13)
AST SERPL W P-5'-P-CCNC: 41 U/L (ref 5–45)
BASOPHILS # BLD AUTO: 0.04 THOUSANDS/ΜL (ref 0–0.1)
BASOPHILS NFR BLD AUTO: 0 % (ref 0–1)
BILIRUB SERPL-MCNC: 0.69 MG/DL (ref 0.2–1)
BUN SERPL-MCNC: 11 MG/DL (ref 5–25)
CALCIUM ALBUM COR SERPL-MCNC: 9.4 MG/DL (ref 8.3–10.1)
CALCIUM SERPL-MCNC: 8.1 MG/DL (ref 8.3–10.1)
CHLORIDE SERPL-SCNC: 109 MMOL/L (ref 100–108)
CHOLEST SERPL-MCNC: 202 MG/DL
CO2 SERPL-SCNC: 26 MMOL/L (ref 21–32)
CREAT SERPL-MCNC: 0.63 MG/DL (ref 0.6–1.3)
EOSINOPHIL # BLD AUTO: 0.06 THOUSAND/ΜL (ref 0–0.61)
EOSINOPHIL NFR BLD AUTO: 1 % (ref 0–6)
ERYTHROCYTE [DISTWIDTH] IN BLOOD BY AUTOMATED COUNT: 13.7 % (ref 11.6–15.1)
GFR SERPL CREATININE-BSD FRML MDRD: 85 ML/MIN/1.73SQ M
GLUCOSE SERPL-MCNC: 98 MG/DL (ref 65–140)
HCT VFR BLD AUTO: 33.2 % (ref 34.8–46.1)
HDLC SERPL-MCNC: 81 MG/DL
HGB BLD-MCNC: 11.4 G/DL (ref 11.5–15.4)
IMM GRANULOCYTES # BLD AUTO: 0.03 THOUSAND/UL (ref 0–0.2)
IMM GRANULOCYTES NFR BLD AUTO: 0 % (ref 0–2)
LDLC SERPL CALC-MCNC: 104 MG/DL (ref 0–100)
LIPASE SERPL-CCNC: 1413 U/L (ref 73–393)
LYMPHOCYTES # BLD AUTO: 1.39 THOUSANDS/ΜL (ref 0.6–4.47)
LYMPHOCYTES NFR BLD AUTO: 14 % (ref 14–44)
MAGNESIUM SERPL-MCNC: 1.9 MG/DL (ref 1.6–2.6)
MCH RBC QN AUTO: 31.6 PG (ref 26.8–34.3)
MCHC RBC AUTO-ENTMCNC: 33.7 G/DL (ref 31.4–37.4)
MCV RBC AUTO: 94 FL (ref 82–98)
MONOCYTES # BLD AUTO: 0.47 THOUSAND/ΜL (ref 0.17–1.22)
MONOCYTES NFR BLD AUTO: 5 % (ref 4–12)
NEUTROPHILS # BLD AUTO: 8.03 THOUSANDS/ΜL (ref 1.85–7.62)
NEUTS SEG NFR BLD AUTO: 80 % (ref 43–75)
NONHDLC SERPL-MCNC: 121 MG/DL
NRBC BLD AUTO-RTO: 0 /100 WBCS
PLATELET # BLD AUTO: 215 THOUSANDS/UL (ref 149–390)
PMV BLD AUTO: 9.5 FL (ref 8.9–12.7)
POTASSIUM SERPL-SCNC: 3.7 MMOL/L (ref 3.5–5.3)
PROT SERPL-MCNC: 5.4 G/DL (ref 6.4–8.2)
RBC # BLD AUTO: 3.54 MILLION/UL (ref 3.81–5.12)
SODIUM SERPL-SCNC: 143 MMOL/L (ref 136–145)
TRIGL SERPL-MCNC: 84 MG/DL
WBC # BLD AUTO: 10.02 THOUSAND/UL (ref 4.31–10.16)

## 2022-03-07 PROCEDURE — 99222 1ST HOSP IP/OBS MODERATE 55: CPT | Performed by: INTERNAL MEDICINE

## 2022-03-07 PROCEDURE — 99222 1ST HOSP IP/OBS MODERATE 55: CPT | Performed by: PHYSICIAN ASSISTANT

## 2022-03-07 PROCEDURE — 99232 SBSQ HOSP IP/OBS MODERATE 35: CPT | Performed by: FAMILY MEDICINE

## 2022-03-07 PROCEDURE — 83735 ASSAY OF MAGNESIUM: CPT | Performed by: INTERNAL MEDICINE

## 2022-03-07 PROCEDURE — 83690 ASSAY OF LIPASE: CPT | Performed by: INTERNAL MEDICINE

## 2022-03-07 PROCEDURE — 85025 COMPLETE CBC W/AUTO DIFF WBC: CPT | Performed by: INTERNAL MEDICINE

## 2022-03-07 PROCEDURE — 80053 COMPREHEN METABOLIC PANEL: CPT | Performed by: INTERNAL MEDICINE

## 2022-03-07 PROCEDURE — 80061 LIPID PANEL: CPT | Performed by: FAMILY MEDICINE

## 2022-03-07 RX ADMIN — SODIUM CHLORIDE, SODIUM LACTATE, POTASSIUM CHLORIDE, AND CALCIUM CHLORIDE 150 ML/HR: .6; .31; .03; .02 INJECTION, SOLUTION INTRAVENOUS at 06:26

## 2022-03-07 RX ADMIN — OMEGA-3 FATTY ACIDS CAP 1000 MG 1000 MG: 1000 CAP at 08:00

## 2022-03-07 RX ADMIN — PANTOPRAZOLE SODIUM 40 MG: 40 TABLET, DELAYED RELEASE ORAL at 05:46

## 2022-03-07 RX ADMIN — SODIUM CHLORIDE, SODIUM LACTATE, POTASSIUM CHLORIDE, AND CALCIUM CHLORIDE 75 ML/HR: .6; .31; .03; .02 INJECTION, SOLUTION INTRAVENOUS at 17:24

## 2022-03-07 RX ADMIN — ENOXAPARIN SODIUM 40 MG: 40 INJECTION SUBCUTANEOUS at 08:00

## 2022-03-07 NOTE — UTILIZATION REVIEW
Initial Clinical Review    Admission: Date/Time/Statement:   Admission Orders (From admission, onward)     Ordered        03/06/22 1155  [address] [facility] [address]  Once                      Orders Placed This Encounter   Procedures    INPATIENT ADMISSION     Standing Status:   Standing     Number of Occurrences:   1     Order Specific Question:   Level of Care     Answer:   Med Surg [16]     Order Specific Question:   Estimated length of stay     Answer:   More than 2 Midnights     Order Specific Question:   Certification     Answer:   I certify that inpatient services are medically necessary for this patient for a duration of greater than two midnights  See H&P and MD Progress Notes for additional information about the patient's course of treatment  ED Arrival Information     Expected Arrival Acuity    - 3/6/2022 06:14 Urgent         Means of arrival Escorted by Service Admission type    [facility]-In [facility] Family [facility] Urgent         Arrival complaint    back pain, nausea, vomiting        Chief Complaint   Patient presents with    Back Pain     patient reports upper right back pain begining around 4am that comes and goes  denies injury to site +Nausea -CP       Initial Presentation: 76 y o  female with a PMH significant for bout of pancreatitis 2016, hypertension, cysto gastrostomy procedure, IV fluid induced pulmonary congestion, who presents to ED from home with complaints of ihaving right upper back pain and then developing abdominal pain that started this am   Associated nausea, vomited x3  Pt had 3 glases wine last night, is occasional ETOH drinker  On exam,pt tachycardic, continues with nausea, no further R upper back pain, continued abdominal pain epigastric and RUQ  Labs - WBC 16 75, lipase 29,464  Imaging consistent with acute pancreatitis ,distended gallbladder, possible gallstone, mild dilation of the proximal CBD-9mm    Pt given IVF, IV analgesic, IV antiemetic in ED   Pt admitted a inpatient with alcohol induced acute pancreatitis   Plan - IVF, possible MRI/MRCP , GI consult, pain control, NPO, etoh cessation, serum lipase, CMP, CBC 3/7  Start PPI  Monitor BP  Date: 3/7  Day 2:   GI-lipase down trend to 1413 today  AST 46 other LFT's WNL  MRCP ordered-showed no evidence of choledocholithiasis, hasseveral tiny gallstones in the gallbladder, nonspecific gallbladder wall thickening to 5 mm, and again findings consistent with acute pancreatitis  Pt currently denies back pain and  abdominal pain, abdomen soft, non distende  Normal bowel sounds  Plan - Continue IVF, advance to CL diet, then lo fat diet as pt improves, check triglycerides, avoid alcohol  General surgery-lap cholecystectomy discussed with pt who would like time  to consider choice of having performed before d/c vs outpatient electively  Continue diet advancement per GI, IVF           ED Triage Vitals   Temperature Pulse Respirations Blood Pressure SpO2   03/06/22 0645 03/06/22 0626 03/06/22 0626 03/06/22 0626 03/06/22 0626   98 4 °F (36 9 °C) 87 18 161/74 100 %      Temp Source Heart Rate Source Patient Position - Orthostatic VS BP Location FiO2 (%)   03/06/22 0645 03/06/22 0626 03/06/22 0626 03/06/22 0626 --   Oral Monitor Sitting Right arm       Pain Score       03/06/22 0626       10 - Worst Possible Pain          Wt Readings from Last 1 Encounters:   03/06/22 47 4 kg (104 lb 8 oz)     Additional Vital Signs:   Date/Time Temp Pulse Resp BP MAP (mmHg) SpO2   03/07/22 1429 99 9 °F (37 7 °C) 77 18 142/81 -- 97 %   03/07/22 1100 -- -- -- -- -- 97 %   03/07/22 07:43:50 99 °F (37 2 °C) 72 16 123/68 86 97 %   03/06/22 21:34:47 99 5 °F (37 5 °C) 87 17 127/69 88 94 %   03/06/22 1453 98 5 °F (36 9 °C) 90 16 114/96 -- 96 %   03/06/22 1430 -- 90 16 136/63 90 95 %   03/06/22 1400 -- 110 Abnormal  16 123/63 88 94 %   03/06/22 1300 -- 96 16 130/79 98 95 %   03/06/22 1200 -- 108 Abnormal  16 132/77 97 95 %   03/06/22 1130 -- 102 16 131/63 91 95 % 03/06/22 1100 -- 102 16 120/72 90 96 %   03/06/22 1045 -- 106 Abnormal  16 138/65 93 96 %   03/06/22 1000 -- 102 15 159/76 109 93 %   03/06/22 0945 -- 102 -- -- -- 95 %   03/06/22 0930 -- 108 Abnormal  16 146/64 92 98 %   03/06/22 0915 -- 108 Abnormal  -- -- -- 99 %   03/06/22 0830 -- 98 15 148/65 93 98 %       Pertinent Labs/Diagnostic Test Results:   MRI abdomen wo contrast and mrcp   Final Result by Cullen Tarango MD (03/06 1704)      Again seen is mild peripancreatic fat stranding around the pancreatic head and neck consistent with acute pancreatitis  Nonspecific diffuse gallbladder wall thickening to 5 mm  There are probable several tiny gallstones in the gallbladder (series 15 image 73-77 )  No pericholecystic fat stranding to suggest acute cholecystitis  There is no evidence of choledocholithiasis  Workstation performed: [id number]         CT abdomen pelvis with contrast   Final Result by Ruth Beal MD (03/06 1111)      Acute interstitial pancreatitis  No evidence of acute pancreatic necrosis or significant peripancreatic fluid collection  Distended gallbladder with potential tiny noncalcified gallstone  Mild dilation of the proximal CBD and 9 mm tapering normally towards the pancreas  No radiopaque choledocholithiasis  Findings may be due to spasm at the ampulla or periampullary edema  Consider follow-up with MRI/MRCP if clinically warranted  Small sliding hiatal hernia  Fluid in the distal esophagus attributed to gastroesophageal reflux  This study demonstrates a significant  finding and was documented as such in [facility] for liaison and referring practitioner notification  Workstation performed: [id number]         XR chest 1 view portable   ED Interpretation by Elva Lefort, MD (03/06 1297)   Unremarkable cardiac silhouette    Clear lungs      Final Result by Gasper Moreau MD (03/06 8272)      No acute cardiopulmonary disease                    Workstation performed: [id number]         3/6 ECG-Normal sinus rhythm  Cannot rule out Anterior infarct , age undetermined      Results from last 7 days   Lab Units 03/07/22  0524 03/06/22  0732   WBC Thousand/uL 10 02 16 75*   HEMOGLOBIN g/dL 11 4* 14 9   HEMATOCRIT % 33 2* 43 8   PLATELETS Thousands/uL 215 293   NEUTROS ABS Thousands/µL 8 03*  --    BANDS PCT %  --  10*         Results from last 7 days   Lab Units 03/07/22  0523 03/06/22  0732   SODIUM mmol/L 143 138   POTASSIUM mmol/L 3 7 3 8   CHLORIDE mmol/L 109* 101   CO2 mmol/L 26 26   ANION GAP mmol/L 8 11   BUN mg/dL 11 10   CREATININE mg/dL 0 63 0 80   EGFR ml/min/1 73sq m 85 70   CALCIUM mg/dL 8 1* 9 2   MAGNESIUM mg/dL 1 9  --      Results from last 7 days   Lab Units 03/07/22  0523 03/06/22  0732   AST U/L 41 46*   ALT U/L 38 28   ALK PHOS U/L 73 116   TOTAL PROTEIN g/dL 5 4* 7 5   ALBUMIN g/dL 2 4* 4 0   TOTAL BILIRUBIN mg/dL 0 69 0 75         Results from last 7 days   Lab Units 03/07/22  0523 03/06/22  0732   GLUCOSE RANDOM mg/dL 98 140           Results from last 7 days   Lab Units 03/06/22  0920 03/06/22  0732   HS TNI 0HR ng/L  --  2   HS TNI 2HR ng/L 2  --    HSTNI D2 ng/L 0  --      Results from last 7 days   Lab Units 03/06/22  0732   D-DIMER QUANTITATIVE ug/ml FEU 0 75*     Results from last 7 days   Lab Units 03/06/22  0732   PROTIME seconds 12 8   INR  0 96   PTT seconds 23     Results from last 7 days   Lab Units 03/06/22  0732   TSH 3RD GENERATON uIU/mL 0 905         Results from last 7 days   Lab Units 03/06/22  0732   LACTIC ACID mmol/L 2 0                         Results from last 7 days   Lab Units 03/07/22  0523 03/06/22  0732   LIPASE u/L 1,413* 29,464*                 Results from last 7 days   Lab Units 03/06/22  1040   CLARITY UA  Clear   COLOR UA  Yellow   SPEC GRAV UA  1 015   PH UA  7 0   GLUCOSE UA mg/dl Negative   KETONES UA mg/dl Trace*   BLOOD UA  Negative   PROTEIN UA mg/dl Negative   NITRITE UA Negative   BILIRUBIN UA  Negative   UROBILINOGEN UA E U /dl 0 2   LEUKOCYTES UA  Negative             ED Treatment:   Medication Administration from 03/06/2022 0614 to 03/06/2022 1556       Date/Time Order Dose Route Action     03/06/2022 0744 sodium chloride 0 9 % bolus 1,000 mL 1,000 mL Intravenous New Bag     03/06/2022 0745 HYDROmorphone (DILAUDID) injection 0 5 mg 0 5 mg Intravenous Given     03/06/2022 0748 ondansetron (ZOFRAN) injection 4 mg 4 mg Intravenous Given     03/06/2022 0901 iohexol (OMNIPAQUE) 350 MG/ML injection (SINGLE-DOSE) 85 mL 85 mL Intravenous Given     03/06/2022 1327 pantoprazole (PROTONIX) EC tablet 40 mg 40 mg Oral Given     03/06/2022 1318 lactated ringers infusion 150 mL/hr Intravenous New Bag     03/06/2022 1327 enoxaparin (LOVENOX) subcutaneous injection 40 mg 40 mg Subcutaneous Given     03/06/2022 1500 LORazepam (ATIVAN) injection 0 5 mg 0 5 mg Intravenous Given        Past Medical History:   Diagnosis Date    Basal cell carcinoma     back    Hypertension     no medications currently    Pancreatitis      Present on Admission:   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   SIRS (systemic inflammatory response syndrome) (HCC)   Primary hypertension   Gastroesophageal reflux disease      Admitting Diagnosis: Acute pancreatitis [K85 90]  Back pain [M54 9]  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 [K85 20]  Age/Sex: 76 y o  female  Admission Orders:  Scheduled Medications:  enoxaparin, 40 mg, Subcutaneous, Daily  fish oil, 1,000 mg, Oral, Daily  pantoprazole, 40 mg, Oral, Early Morning      Continuous IV Infusions:  lactated ringers, 75 mL/hr, Intravenous, Continuous      PRN Meds:  acetaminophen, 650 mg, Oral, Q6H PRN  HYDROmorphone, 0 2 mg, Intravenous, Q3H PRN  metoprolol, 5 mg, Intravenous, Q6H PRN  ondansetron, 4 mg, Intravenous, Q6H PRN  oxyCODONE, 2 5 mg, Oral, Q4H PRN  oxyCODONE, 5 mg, Oral, Q4H PRN  senna, 1 tablet, Oral, HS PRN    SCD's   OOB as caterina  NPO  CL diet 3/7 @1208    IP CONSULT TO GASTROENTEROLOGY  IP CONSULT TO ACUTE CARE SURGERY    Network Utilization Review Department  ATTENTION: Please call with any questions or concerns to [phone] and carefully listen to the prompts so that you are directed to the right person  All voicemails are confidential   [place] Doing all requests for admission clinical reviews, approved or denied determinations and any other requests to dedicated fax number below belonging to the campus where the patient is receiving treatment   List of dedicated fax numbers for the Facilities:  [address] DENIALS (Administrative/Medical Necessity) [phone]   [address] (Maternity/NICU/Pediatrics) [phone] [address]  [address] [facility] [address] [place] [address] [place] [address] [facility] [place] [address] [phone]

## 2022-03-07 NOTE — ASSESSMENT & PLAN NOTE
· In the past, patient was prescribed with proton pump inhibitor, however, patient discontinued this as she read that it can cause low magnesium  · CT scan of the abdomen done today also revealed a small sliding hiatal hernia and fluid in the distal esophagus attributed to gastroesophageal reflux  Plan:  · Restart Protonix daily   · Monitor for s/s

## 2022-03-07 NOTE — PLAN OF CARE
Problem: Potential for Falls  Goal: Patient will remain free of falls  Description: INTERVENTIONS:  - Educate patient/family on patient safety including physical limitations  - Instruct patient to call for assistance with activity   - Consult OT/PT to assist with strengthening/mobility   - Keep Call bell within reach  - Keep bed low and locked with side rails adjusted as appropriate  - Keep care items and personal belongings within reach  - Initiate and maintain comfort rounds  - Make Fall Risk Sign visible to staff  - Offer Toileting every 2 Hours, in advance of need  - Initiate/Maintain alarm  - Obtain necessary fall risk management equipment:   - Apply yellow socks and bracelet for high fall risk patients  - Consider moving patient to room near nurses station  3/7/2022 1108 by David Munguia RN  Outcome: Progressing  3/7/2022 1107 by David Munguia RN  Outcome: Progressing     Problem: PAIN - ADULT  Goal: Verbalizes/displays adequate comfort level or baseline comfort level  Description: Interventions:  - Encourage patient to monitor pain and request assistance  - Assess pain using appropriate pain scale  - Administer analgesics based on type and severity of pain and evaluate response  - Implement non-pharmacological measures as appropriate and evaluate response  - Consider cultural and social influences on pain and pain management  - Notify physician/advanced practitioner if interventions unsuccessful or patient reports new pain  Outcome: Progressing     Problem: SAFETY ADULT  Goal: Patient will remain free of falls  Description: INTERVENTIONS:  - Educate patient/family on patient safety including physical limitations  - Instruct patient to call for assistance with activity   - Consult OT/PT to assist with strengthening/mobility   - Keep Call bell within reach  - Keep bed low and locked with side rails adjusted as appropriate  - Keep care items and personal belongings within reach  - Initiate and maintain comfort rounds  - Make Fall Risk Sign visible to staff  - Offer Toileting every 2 Hours, in advance of need  - Initiate/Maintain alarm  - Obtain necessary fall risk management equipment:   - Apply yellow socks and bracelet for high fall risk patients  - Consider moving patient to room near nurses station  3/7/2022 1108 by Farnaz Ring RN  Outcome: Progressing  3/7/2022 1107 by Farnaz Ring RN  Outcome: Progressing  Goal: Maintain or return to baseline ADL function  Description: INTERVENTIONS:  -  Assess patient's ability to carry out ADLs; assess patient's baseline for ADL function and identify physical deficits which impact ability to perform ADLs (bathing, care of mouth/teeth, toileting, grooming, dressing, etc )  - Assess/evaluate cause of self-care deficits   - Assess range of motion  - Assess patient's mobility; develop plan if impaired  - Assess patient's need for assistive devices and provide as appropriate  - Encourage maximum independence but intervene and supervise when necessary  - Involve family in performance of ADLs  - Assess for home care needs following discharge   - Consider OT consult to assist with ADL evaluation and planning for discharge  - Provide patient education as appropriate  Outcome: Progressing  Goal: Maintains/Returns to pre admission functional level  Description: INTERVENTIONS:  - Perform BMAT or MOVE assessment daily    - Set and communicate daily mobility goal to care team and patient/family/caregiver  - Collaborate with rehabilitation services on mobility goals if consulted  - Perform Range of Motion 3 times a day  - Reposition patient every 2 hours    - Dangle patient 3 times a day  - Stand patient 3 times a day  - Ambulate patient 3 times a day  - Out of bed to chair 3 times a day   - Out of bed for meals 3 times a day  - Out of bed for toileting  - Record patient progress and toleration of activity level   Outcome: Progressing

## 2022-03-07 NOTE — ASSESSMENT & PLAN NOTE
· Has history of essential hypertension, however, not on any medications anymore as she claims her blood pressures were constantly normal, since she had bouts of pancreatitis in the past   · Recently, she told me, that her systolic blood pressures at home have been running high at times in the 170s  When I saw the patient, her blood pressure is normal   · Possible fluctuations in patient's blood pressures today due to patient's pain and some anxiety  · Patient used to take lisinopril with hydrochlorothiazide in the past     Plan:  · Continue to monitor BP - currently stable with pain controlled not needing any PRNs thus far  · Lopressor 5 mg IV prn for now

## 2022-03-07 NOTE — ASSESSMENT & PLAN NOTE
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  It is also important to note that in 1 of the notes of her doctors, there was a diagnosis also of chronic pancreatitis  · CT scan done today revealed acute interstitial pancreatitis without evidence of necrosis or significant peripancreatic fluid collection  Patient was also found to have distended gallbladder with potential tiny noncalcified gallstone  There is a mild dilatation of the proximal CBD at 9 mm  No radiopaque choledocholithiasis  Findings may be due to spasm at the ampulla or periampullary edema  Consider MRI/MRCP if clinically warranted  · IV fluid hydration  From my discussion with the patient and from my review of patient's previous record, when patient had the pancreatitis in 2016, patient developed IV fluid induced CHF  Thus for a time patient was on Lasix    Echocardiogram at that time was essentially normal except for a mild diastolic dysfunction, mild pulmonary hypertension, mild tricuspid valve and mitral valve regurgitation  According to the patient, her primary care physician, Dr Chaka Larson, told her, that she the did have CHF and that the congestion at that time just happened with the aggressive IV fluids  Thus patient was eventually taken off from diuretic medications  Thus, from my discussion with the patient, she is okay for now with a rate of 100 mL/hour as far as her IV fluid rate is concerned  She is okay adjusting this accordingly, particularly if her pancreatitis is not improving/worsening  · MRCP: Nonspecific diffuse gallbladder wall thickening to 5 mm  There are probable several tiny gallstones in the gallbladder (series 15 image 73-77 )  No pericholecystic fat stranding to suggest acute cholecystitis  Plan:  · Advance diet to Clear liquid  · GI Following - Recommend general sx consult as well with intraluminal GB stones  · General Sx Consulted: Discussed option of Lap-Niharika prior to d/c v  Outpatient with possibility for recurrence of s/s  Pt still deciding     · Continue gentle IVFs at 75 cc/hr  · Lipid panel pending  · Monitor daily labs: CMP, CBC, Lipase

## 2022-03-07 NOTE — PROGRESS NOTES
[facility]  Progress Note - José Miguel Faustin 1945, 76 y o  female MRN: [id number]  Unit/Bed#: W -01 Encounter: [id number]  Primary Care Provider: Akash Thorpe MD   Date and time admitted to hospital: 3/6/2022  6:25 AM    * 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  Assessment & Plan  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, patient eventually underwent cysto gastrostomy that same month  In October 2017, patient again had acute pancreatitis, etiology unspecified with no infection or necrosis at that time  Patient eventually underwent an endoscopic ultrasound which revealed pancreatic cyst, suggestive of pseudocyst, no necrosis, no gallbladder stones, no bile duct stones or masses with normal pancreatic duct  The above history [facility]  It is also important to note that in 1 of the notes of her doctors, there was a diagnosis also of chronic pancreatitis  · CT scan done today revealed acute interstitial pancreatitis without evidence of necrosis or significant peripancreatic fluid collection  Patient was also found to have distended gallbladder with potential tiny noncalcified gallstone  There is a mild dilatation of the proximal CBD at 9 mm  No radiopaque choledocholithiasis    Findings may be due to spasm at the ampulla or periampullary edema  Consider MRI/MRCP if clinically warranted  · IV fluid hydration  From my discussion with the patient and from my review of patient's previous record, when patient had the pancreatitis in 2016, patient developed IV fluid induced CHF  Thus for a time patient was on Lasix  Echocardiogram at that time was essentially normal except for a mild diastolic dysfunction, mild pulmonary hypertension, mild tricuspid valve and mitral valve regurgitation  According to the patient, her primary care physician, Dr Yves Wiley, told her, that she the did have CHF and that the congestion at that time just happened with the aggressive IV fluids  Thus patient was eventually taken off from diuretic medications  Thus, from my discussion with the patient, she is okay for now with a rate of 100 mL/hour as far as her IV fluid rate is concerned  She is okay adjusting this accordingly, particularly if her pancreatitis is not improving/worsening  · MRCP: Nonspecific diffuse gallbladder wall thickening to 5 mm  There are probable several tiny gallstones in the gallbladder (series 15 image [id number] )  No pericholecystic fat stranding to suggest acute cholecystitis  Plan:  · Advance diet to Clear liquid  · GI Following - Recommend general sx consult as well with intraluminal GB stones  · General Sx Consulted: Discussed option of Lap-Niharika prior to d/c v  Outpatient with possibility for recurrence of s/s  Pt still deciding  · Continue gentle IVFs at 75 cc/hr  · Lipid panel pending  · Monitor daily labs: CMP, CBC, Lipase       SIRS (systemic inflammatory response syndrome) (HCC)  Assessment & Plan  · Present on admission with leukocytosis and mild tachycardia  · Likely cause:  Acute pancreatitis  · Leukocytosis has improved off Abx  · Monitor CBC with differential count  · Monitor vital signs  · Please see plans under acute pancreatitis      Primary hypertension  Assessment & Plan  · Has history of essential hypertension, however, not on any medications anymore as she claims her blood pressures were constantly normal, since she had bouts of pancreatitis in the past   · Recently, she told me, that her systolic blood pressures at home have been running high at times in the 170s  When I saw the patient, her blood pressure is normal   · Possible fluctuations in patient's blood pressures today due to patient's pain and some anxiety  · Patient used to take lisinopril with hydrochlorothiazide in the past     Plan:  · Continue to monitor BP - currently stable with pain controlled not needing any PRNs thus far  · Lopressor 5 mg IV prn for now  Gastroesophageal reflux disease  Assessment & Plan  · In the past, patient was prescribed with proton pump inhibitor, however, patient discontinued this as she read that it can cause low magnesium  · CT scan of the abdomen done today also revealed a small sliding hiatal hernia and fluid in the distal esophagus attributed to gastroesophageal reflux  Plan:  · Restart Protonix daily   · Monitor for s/s  VTE Pharmacologic Prophylaxis: VTE Score: 3 Moderate Risk (Score 3-4) - Pharmacological DVT Prophylaxis Ordered: enoxaparin (Lovenox)  Patient Centered Rounds: I performed bedside rounds with nursing staff today  Discussions with Specialists or Other Care Team Provider: GI, General Surgery    Education and Discussions with Family / Patient: Updated  () via phone  Time Spent for Care: 45 minutes  More than 50% of total time spent on counseling and coordination of care as described above  Current Length of Stay: 1 day(s)  Current Patient Status: Inpatient   Certification Statement: The patient will continue to require additional inpatient hospital stay due to Acute Pancreatitis  Discharge Plan: Anticipate discharge in 24-48 hrs to home  Code Status: Level 1 - Full Code    Subjective:   Patient seen examined lying comfortably in bed    She states her pain is much improved  Patient believe she is ready to start eating something  She denies any fevers, chills, nausea, vomiting, diarrhea  She denies any shortness of breath, chest pain  She does still relate that pressing in her epigastric region there still is some tenderness  Objective:     Vitals:   Temp (24hrs), Av °F (37 2 °C), Min:98 5 °F (36 9 °C), Max:99 5 °F (37 5 °C)    Temp:  [98 5 °F (36 9 °C)-99 5 °F (37 5 °C)] 99 °F (37 2 °C)  HR:  [72-90] 72  Resp:  [16-17] 16  BP: (114-136)/(63-96) 123/68  SpO2:  [94 %-97 %] 97 %  Body mass index is 19 11 kg/m²  Input and Output Summary (last 24 hours): Intake/Output Summary (Last 24 hours) at 3/7/2022 1410  Last data filed at 3/7/2022 1256  Gross per 24 hour   Intake 3005 ml   Output --   Net 3005 ml       Physical Exam:   Physical Exam  Vitals and nursing note reviewed  Constitutional:       General: She is not in acute distress  Appearance: Normal appearance  HENT:      Head: Normocephalic and atraumatic  Right Ear: External ear normal       Left Ear: External ear normal       Nose: Nose normal  No congestion  Mouth/Throat:      Mouth: Mucous membranes are dry  Pharynx: No oropharyngeal exudate  Eyes:      Extraocular Movements: Extraocular movements intact  Conjunctiva/sclera: Conjunctivae normal       Pupils: Pupils are equal, round, and reactive to light  Cardiovascular:      Rate and Rhythm: Normal rate and regular rhythm  Pulses: Normal pulses  Heart sounds: Normal heart sounds  No murmur heard  Pulmonary:      Effort: Pulmonary effort is normal       Breath sounds: Normal breath sounds  No wheezing, rhonchi or rales  Abdominal:      General: Bowel sounds are normal       Palpations: Abdomen is soft  Tenderness: There is abdominal tenderness  There is no guarding or rebound  Comments: TTP in epigastric region   Musculoskeletal:         General: Normal range of motion  Cervical back: Normal range of motion  Right lower leg: No edema  Left lower leg: No edema  Lymphadenopathy:      Cervical: No cervical adenopathy  Skin:     General: Skin is warm  Capillary Refill: Capillary refill takes less than 2 seconds  Findings: No bruising or erythema  Neurological:      General: No focal deficit present  Mental Status: She is alert and oriented to person, place, and time  Psychiatric:         Mood and Affect: Mood normal          Behavior: Behavior normal           Additional Data:     Labs:  Results from last 7 days   Lab Units 03/07/22  0524 03/06/22  0732 03/06/22  0732   WBC Thousand/uL 10 02   < > 16 75*   HEMOGLOBIN g/dL 11 4*   < > 14 9   HEMATOCRIT % 33 2*   < > 43 8   PLATELETS Thousands/uL 215   < > 293   BANDS PCT %  --   --  10*   NEUTROS PCT % 80*  --   --    LYMPHS PCT % 14  --   --    LYMPHO PCT %  --   --  3*   MONOS PCT % 5  --   --    MONO PCT %  --   --  4   EOS PCT % 1  --  0    < > = values in this interval not displayed  Results from last 7 days   Lab Units 03/07/22  0523   SODIUM mmol/L 143   POTASSIUM mmol/L 3 7   CHLORIDE mmol/L 109*   CO2 mmol/L 26   BUN mg/dL 11   CREATININE mg/dL 0 63   ANION GAP mmol/L 8   CALCIUM mg/dL 8 1*   ALBUMIN g/dL 2 4*   TOTAL BILIRUBIN mg/dL 0 69   ALK PHOS U/L 73   ALT U/L 38   AST U/L 41   GLUCOSE RANDOM mg/dL 98     Results from last 7 days   Lab Units 03/06/22  0732   INR  0 96             Results from last 7 days   Lab Units 03/06/22  0732   LACTIC ACID mmol/L 2 0       Lines/Drains:  Invasive Devices  Report    Peripheral Intravenous Line            Peripheral IV 03/07/22 Dorsal (posterior); Right Forearm <1 day                      Imaging: Reviewed radiology reports from this admission including: abdominal/pelvic CT and MRI abdomen/MRCP    Recent Cultures (last 7 days):         Last 24 Hours Medication List:   Current Facility-Administered Medications   Medication Dose Route Frequency Provider Last Rate    acetaminophen  650 mg Oral Q6H PRN Kumar Amaral MD      enoxaparin  40 mg Subcutaneous Daily Kumar Amaral MD      fish oil  1,000 mg Oral Daily Kumar Amaral MD      HYDROmorphone  0 2 mg Intravenous Q3H PRN Kumar Amaral MD      lactated ringers  75 mL/hr Intravenous Continuous Carlton Koroma DO 75 mL/hr (03/07/22 0920)    metoprolol  5 mg Intravenous Q6H PRN Kumar Amaral MD      ondansetron  4 mg Intravenous Q6H PRN Kumar Amaral MD      oxyCODONE  2 5 mg Oral Q4H PRN Kumar Amaral MD      oxyCODONE  5 mg Oral Q4H PRN Kumar Amaral MD      pantoprazole  40 mg Oral Early Morning Ramez Damon MD      senna  1 tablet Oral HS PRN Melchor Mueller MD          Today, Patient Was Seen By: Carlton Koroma DO    **Please Note: This note may have been constructed using a voice recognition system  **

## 2022-03-07 NOTE — ASSESSMENT & PLAN NOTE
· Present on admission with leukocytosis and mild tachycardia  · Likely cause:  Acute pancreatitis  · Leukocytosis has improved off Abx  · Monitor CBC with differential count  · Monitor vital signs  · Please see plans under acute pancreatitis

## 2022-03-07 NOTE — CONSULTS
Consultation -General Surgery  Angella Bains 76 y o  female MRN: [id number]  Unit/Bed#: W -01 Encounter: [id number]    Addendum:  After discussion with Xiomara Kennedy, would recommend short-interval outpatient elective cholecystectomy  ASSESSMENT:  77 yo female with acute pancreatitis and gallstones identified on MRCP  Afebrile, VSS  Leukocytosis resolved  Lipase 1413 from 29K yesterday       Plan:  -Laparoscopic cholecystectomy before discharge was discussed with patient  She had some reservations with her up-coming work schedule and her improvement of symptoms  Short-interval outpatient elective surgery was also discussed as well as possibility of re-occurrence  Patient would like some time to think about surgery   -For now, recommend advancing diet as tolerated per GI/primary team   -IVF for hydration   -Pain control, antiemetics PRN  -Will discuss with Dr Pickens      Reason for Consult / Principal Problem:    HPI: Angella Bains is a 76y o  year old female with pmhx of GERD and necrotizing pancreatitis c/p pancreatic cyst in 2016 who presents with acute pancreatitis without necrosis  Patient developed acute, severe right-sided back pain, just under her scapula, yesterday morning approximately 4 am  Patient attempted to get ready for work but developed severe nausea and vomited 4 times before presenting to the ED to be evaluated  She denies any abdominal pain and states her back pack and nausea have all resolved  She has tolerated sips of clear liquids today without pain or nausea  Patient reports social drinking, 1-1 5 glasses of wine on the weekend or with dinner but states she is not a daily drinker  She did have 3 glasses of wine Saturday night just prior to the beginning of her symptoms  Previous episode of pancreatitis in [id number], patient states she developed severe lower abdominal and back pain, bilaterally   She was admitted at [facility] and states they drained a quart of fluid out of her pancreas  She also reports iatrogenic esophageal perforation during and NGT placement  She states when she was discharged she was still very sick and ultimately returned to the hospital again for the same symptoms  Since then she has not had any further episodes of pancreatitis  She states she always felt as though she had gallbladder issues, as she commonly would get any upset stomach/pain after eating fatty meals  This was not associated with vomiting or diarrhea and resolved on its own  She now just avoids high fatty foods  Review of Systems   Constitutional: Negative for activity change, appetite change, chills and fever  Cardiovascular: Negative for chest pain  Gastrointestinal: Positive for nausea and vomiting  Negative for abdominal pain and diarrhea  Musculoskeletal: Positive for back pain         Historical Information   Past Medical History:   Diagnosis Date    Basal cell carcinoma     back    Hypertension     no medications currently    Pancreatitis      Past Surgical History:   Procedure Laterality Date    TUBAL LIGATION      WISDOM TOOTH EXTRACTION       Social History   Social History     Substance and Sexual Activity   Alcohol Use Yes    Comment: rarely     Social History     Substance and Sexual Activity   Drug Use Not on file     Social History     Tobacco Use   Smoking Status Former Smoker    Types: Cigarettes    Quit date: 2008    Years since quittin 1   Smokeless Tobacco Never Used     Family History   Problem Relation Age of Onset    Lung cancer Mother     Heart attack Father        Meds/Allergies     Medications Prior to Admission   Medication    mometasone (ELOCON) 0 1 % lotion    esomeprazole (NexIUM) 40 MG capsule    fluocinolone acetonide (DERMOTIC) 0 01 % otic oil    Omega-3 Fatty Acids (FISH OIL PO)    omeprazole (PriLOSEC) 20 mg delayed release capsule     Current Facility-Administered Medications   Medication Dose Route Frequency    acetaminophen (TYLENOL) tablet 650 mg  650 mg Oral Q6H PRN    enoxaparin (LOVENOX) subcutaneous injection 40 mg  40 mg Subcutaneous Daily    fish oil capsule 1,000 mg  1,000 mg Oral Daily    HYDROmorphone (DILAUDID) injection 0 2 mg  0 2 mg Intravenous Q3H PRN    lactated ringers infusion  75 mL/hr Intravenous Continuous    metoprolol (LOPRESSOR) injection 5 mg  5 mg Intravenous Q6H PRN    ondansetron (ZOFRAN) injection 4 mg  4 mg Intravenous Q6H PRN    oxyCODONE (ROXICODONE) IR tablet 2 5 mg  2 5 mg Oral Q4H PRN    oxyCODONE (ROXICODONE) IR tablet 5 mg  5 mg Oral Q4H PRN    pantoprazole (PROTONIX) EC tablet 40 mg  40 mg Oral Early Morning    senna (SENOKOT) tablet 8 6 mg  1 tablet Oral HS PRN       Allergies   Allergen Reactions    Pollen Extract Other (See Comments)     Runny nose & congestion       Objective     Blood pressure 123/68, pulse 72, temperature 99 °F (37 2 °C), resp  rate 16, height 5' 2" (1 575 m), weight 47 4 kg (104 lb 8 oz), SpO2 97 %  Intake/Output Summary (Last 24 hours) at 3/7/2022 1130  Last data filed at 3/7/2022 0920  Gross per 24 hour   Intake 3005 ml   Output --   Net 3005 ml       PHYSICAL EXAM  Physical Exam  Constitutional:       Appearance: Normal appearance  She is not ill-appearing  Cardiovascular:      Rate and Rhythm: Normal rate  Pulmonary:      Effort: Pulmonary effort is normal  No respiratory distress  Abdominal:      General: Abdomen is flat  There is no distension  Palpations: Abdomen is soft  Tenderness: There is abdominal tenderness in the right upper quadrant and epigastric area  There is no guarding or rebound  Comments: Minimal tenderness/discomfort with deep palpation in epigastrium/RUQ    Musculoskeletal:      Right lower leg: No edema  Left lower leg: No edema  Skin:     General: Skin is warm and dry  Neurological:      General: No focal deficit present  Mental Status: She is alert     Psychiatric:         Mood and Affect: Mood normal        Lab Results:   CBC:   Lab Results   Component Value Date    WBC 10 02 03/07/2022    HGB 11 4 (L) 03/07/2022    HCT 33 2 (L) 03/07/2022    MCV 94 03/07/2022     03/07/2022    MCH 31 6 03/07/2022    MCHC 33 7 03/07/2022    RDW 13 7 03/07/2022    MPV 9 5 03/07/2022    NRBC 0 03/07/2022   , CMP:   Lab Results   Component Value Date    SODIUM 143 03/07/2022    K 3 7 03/07/2022     (H) 03/07/2022    CO2 26 03/07/2022    BUN 11 03/07/2022    CREATININE 0 63 03/07/2022    CALCIUM 8 1 (L) 03/07/2022    AST 41 03/07/2022    ALT 38 03/07/2022    ALKPHOS 73 03/07/2022    EGFR 85 03/07/2022   , Amylase: No results found for: AMYLASE, Lipase:   Lab Results   Component Value Date    LIPASE 1,413 (H) 03/07/2022     Imaging: I have personally reviewed pertinent reports  EKG, Pathology, and Other Studies: I have personally reviewed pertinent reports  Counseling / Coordination of Care  Total time spent today  20 minutes  Greater than 50% of total time was spent with the patient and / or family counseling and / or coordination of care           DELILAH Hammer  3/7/2022 11:30 AM

## 2022-03-07 NOTE — CONSULTS
Consultation - [facility]  Brandy Fleming 76 y o  female MRN: [id number]  Unit/Bed#: W -01 Encounter: [id number]        Inpatient consult to gastroenterology  Consult performed by: [id number] [address] [place], PA-C  Consult ordered by: Mery Bah MD          Reason for Consult / Principal Problem:  Acute pancreatitis    HPI: Brandy Fleming is a 76y o  year old female with history of basal cell carcinoma, pancreatitis who presented to the emergency room yesterday morning with complaint of severe upper back pain, going on for about 3 hours by that point  She said she did have 3 glasses of wine the previous night, though denied alcohol consumption over the last couple of years  The patient reported history of an episode of pancreatitis a few years ago  Review of records from [facility] indicate that she was hospitalized for some time in 2016 with an episode of necrotizing pancreatitis, 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 at that time, from which she developed significant fluid collections and pseudocyst, which was addressed with cystogastrostomy in January 2017, and cystogastrostomy stent removal the following month February 2017  A large duodenal polyp was also removed at that time but 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  On this presentation, her lipase was found to be elevated at 29,464; it is down to 1,413 this morning  Her liver enzymes appear normal save for a very mildly elevated AST of 46  CT scan showed evidence of pancreatitis, a possible gallstone, and there is also dilatation of the CBD to 9 mm (though this also appears to have been the case on a CT scan from August 2016)  MRCP was ordered to follow and this showed no evidence of choledocholithiasis, though there were several tiny gallstones in the gallbladder, nonspecific gallbladder wall thickening to 5 mm, and again findings consistent with acute pancreatitis      She is currently ordered for NPO status and lactated Ringer's infusion 150 cc/hour  At this time the patient says she is feeling much better than she was yesterday, actually not experiencing any abdominal pain or back pain at this time, says she got 1 dose of Dilaudid yesterday that cleared upper pain it does not seem to have come back yet  She tells me her last colonoscopy was around 2017 and she thinks a polyp was removed at that time  She says she has not started any new medications lately and in fact does not actually take any medications in the long-term  Her medications list does include Nexium but she says she stopped taking this a while ago  REVIEW OF SYSTEMS:    CONSTITUTIONAL: Denies any fever, chills, or rigors  Good appetite, and no recent weight loss  HEENT: No earache or tinnitus  Denies hearing loss or visual disturbances  CARDIOVASCULAR: No chest pain or palpitations  RESPIRATORY: Denies any cough, hemoptysis, shortness of breath or dyspnea on exertion  GASTROINTESTINAL: As noted in the History of Present Illness  GENITOURINARY: No problems with urination  Denies any hematuria or dysuria  NEUROLOGIC: No dizziness or vertigo, denies headaches  MUSCULOSKELETAL: Denies any muscle or joint pain  SKIN: Denies skin rashes or itching  ENDOCRINE: Denies excessive thirst  Denies intolerance to heat or cold  PSYCHOSOCIAL: Denies depression or anxiety  Denies any recent memory loss         Historical Information   Past Medical History:   Diagnosis Date    Basal cell carcinoma     back    Hypertension     no medications currently    Pancreatitis      Past Surgical History:   Procedure Laterality Date    TUBAL LIGATION      WISDOM TOOTH EXTRACTION       Social History   Social History     Substance and Sexual Activity   Alcohol Use Yes    Comment: rarely     Social History     Substance and Sexual Activity   Drug Use Not on file     Social History     Tobacco Use   Smoking Status Former Smoker    Types: Cigarettes    Quit date: 2008    Years since quittin 1   Smokeless Tobacco Never Used     Family History   Problem Relation Age of Onset    Lung cancer Mother     Heart attack Father        Meds/Allergies     Medications Prior to Admission   Medication    mometasone (ELOCON) 0 1 % lotion    esomeprazole (NexIUM) 40 MG capsule    fluocinolone acetonide (DERMOTIC) 0 01 % otic oil    Omega-3 Fatty Acids (FISH OIL PO)    omeprazole (PriLOSEC) 20 mg delayed release capsule     Current Facility-Administered Medications   Medication Dose Route Frequency    acetaminophen (TYLENOL) tablet 650 mg  650 mg Oral Q6H PRN    enoxaparin (LOVENOX) subcutaneous injection 40 mg  40 mg Subcutaneous Daily    fish oil capsule 1,000 mg  1,000 mg Oral Daily    HYDROmorphone (DILAUDID) injection 0 2 mg  0 2 mg Intravenous Q3H PRN    lactated ringers infusion  150 mL/hr Intravenous Continuous    metoprolol (LOPRESSOR) injection 5 mg  5 mg Intravenous Q6H PRN    ondansetron (ZOFRAN) injection 4 mg  4 mg Intravenous Q6H PRN    oxyCODONE (ROXICODONE) IR tablet 2 5 mg  2 5 mg Oral Q4H PRN    oxyCODONE (ROXICODONE) IR tablet 5 mg  5 mg Oral Q4H PRN    pantoprazole (PROTONIX) EC tablet 40 mg  40 mg Oral Early Morning    senna (SENOKOT) tablet 8 6 mg  1 tablet Oral HS PRN       Allergies   Allergen Reactions    Pollen Extract Other (See Comments)     Runny nose & congestion           Objective     Blood pressure 123/68, pulse 72, temperature 99 °F (37 2 °C), resp  rate 16, height 5' 2" (1 575 m), weight 47 4 kg (104 lb 8 oz), SpO2 97 %        Intake/Output Summary (Last 24 hours) at 3/7/2022 0841  Last data filed at 3/7/2022 7714  Gross per 24 hour   Intake 3570 ml   Output --   Net 3570 ml         PHYSICAL EXAM     General Appearance:   Alert, cooperative, no distress, appears stated age    HEENT:   Normocephalic, atraumatic, anicteric      Neck:  Supple, symmetrical, trachea midline, no adenopathy;    thyroid: no enlargement/tenderness/nodules; no carotid  bruit or JVD    Lungs:   Clear to auscultation bilaterally; no rales, rhonchi or wheezing; respirations unlabored    Heart[de-identified]   S1 and S2 normal; regular rate and rhythm; no murmur, rub, or gallop     Abdomen:   Soft, non-tender, non-distended; normal bowel sounds; no masses, no organomegaly    Genitalia:   Deferred    Rectal:   Deferred    Extremities:  No cyanosis, clubbing or edema    Pulses:  2+ and symmetric all extremities    Skin:  Skin color, texture, turgor normal, no rashes or lesions    Lymph nodes:  No palpable cervical, axillary or inguinal lymphadenopathy        Lab Results:   Admission on 03/06/2022   Component Date Value    WBC 03/06/2022 16 75*    RBC 03/06/2022 4 68     Hemoglobin 03/06/2022 14 9     Hematocrit 03/06/2022 43 8     MCV 03/06/2022 94     MCH 03/06/2022 31 8     MCHC 03/06/2022 34 0     RDW 03/06/2022 13 3     MPV 03/06/2022 9 4     Platelets 53/82/2163 293     Sodium 03/06/2022 138     Potassium 03/06/2022 3 8     Chloride 03/06/2022 101     CO2 03/06/2022 26     ANION GAP 03/06/2022 11     BUN 03/06/2022 10     Creatinine 03/06/2022 0 80     Glucose 03/06/2022 140     Calcium 03/06/2022 9 2     AST 03/06/2022 46*    ALT 03/06/2022 28     Alkaline Phosphatase 03/06/2022 116     Total Protein 03/06/2022 7 5     Albumin 03/06/2022 4 0     Total Bilirubin 03/06/2022 0 75     eGFR 03/06/2022 70     Lipase 03/06/2022 29,464*    LACTIC ACID 03/06/2022 2 0     hs TnI 0hr 03/06/2022 2     D-Dimer, Quant 03/06/2022 0 75*    Protime 03/06/2022 12 8     INR 03/06/2022 0 96     PTT 03/06/2022 23     Segmented % 03/06/2022 83*    Bands % 03/06/2022 10*    Lymphocytes % 03/06/2022 3*    Monocytes % 03/06/2022 4     Eosinophils, % 03/06/2022 0     Basophils % 03/06/2022 0     Absolute Neutrophils 03/06/2022 15 58*    Lymphocytes Absolute 03/06/2022 0 50*    Monocytes Absolute 03/06/2022 0 67     Eosinophils Absolute 03/06/2022 0 00     Basophils Absolute 03/06/2022 0 00     RBC Morphology 03/06/2022 Normal     Platelet Estimate 72/67/3135 Adequate     hs TnI 2hr 03/06/2022 2     Delta 2hr hsTnI 03/06/2022 0     Color, UA 03/06/2022 Yellow     Clarity, UA 03/06/2022 Clear     pH, UA 03/06/2022 7 0     Leukocytes, UA 03/06/2022 Negative     Nitrite, UA 03/06/2022 Negative     Protein, UA 03/06/2022 Negative     Glucose, UA 03/06/2022 Negative     Ketones, UA 03/06/2022 Trace*    Urobilinogen, UA 03/06/2022 0 2     Bilirubin, UA 03/06/2022 Negative     Blood, UA 03/06/2022 Negative     Specific Gravity, UA 03/06/2022 1 015     TSH 3RD GENERATON 03/06/2022 0 905     Ventricular Rate 03/06/2022 83     Atrial Rate 03/06/2022 83     IL Interval 03/06/2022 144     QRSD Interval 03/06/2022 78     QT Interval 03/06/2022 362     QTC Interval 03/06/2022 425     P Axis 03/06/2022 80     QRS Axis 03/06/2022 66     T Wave Axis 03/06/2022 76     WBC 03/07/2022 10 02     RBC 03/07/2022 3 54*    Hemoglobin 03/07/2022 11 4*    Hematocrit 03/07/2022 33 2*    MCV 03/07/2022 94     MCH 03/07/2022 31 6     MCHC 03/07/2022 33 7     RDW 03/07/2022 13 7     MPV 03/07/2022 9 5     Platelets 69/42/8457 215     nRBC 03/07/2022 0     Neutrophils Relative 03/07/2022 80*    Immat GRANS % 03/07/2022 0     Lymphocytes Relative 03/07/2022 14     Monocytes Relative 03/07/2022 5     Eosinophils Relative 03/07/2022 1     Basophils Relative 03/07/2022 0     Neutrophils Absolute 03/07/2022 8 03*    Immature Grans Absolute 03/07/2022 0 03     Lymphocytes Absolute 03/07/2022 1 39     Monocytes Absolute 03/07/2022 0 47     Eosinophils Absolute 03/07/2022 0 06     Basophils Absolute 03/07/2022 0 04     Magnesium 03/07/2022 1 9     Lipase 03/07/2022 1,413*       Imaging Studies: I have personally reviewed pertinent reports          Endoscopic Gastroduodenoscopy Procedure Note 2/1/2017  Procedure: Endoscopic Gastroduodenoscopy with a cyst gastrostomy stent removal  Also with a duodenal polyp resection  Indications: Patient with a large pseudocyst  She has been treated by cyst gastrostomy  She is here for stent/cyst evaluation and removal if needed  Sedation: MAC sedation  Procedure Details   Informed consent was obtained for the procedure, including conscious sedation  Risks of pancreatitis, infection, perforation, hemorrhage, adverse drug reaction and aspiration were discussed  The patient was placed in the left lateral decubitus position  Based on the pre-procedure assessment, including review of the patient's medical history, medications, allergies, and review of systems, she had been deemed to be an appropriate candidate for sedation  She was monitored continuously with ECG tracing, pulse oximetry, blood pressure monitoring, and direct observation  The Olympus endoscope was inserted into the mouth and advanced under direct visualization to distal duodenum  A careful inspection was made as the gastroscope was withdrawn, including a retroflexed view of the proximal stomach  Appropriate photo documentation was obtained  Findings:  Esophagus: The proximal mid and distal esophagus is normal  The GE junction did show small sliding-type hiatal hernia  Stomach: The gastric lumen had a patent prior place a gastrostomy stent  The cyst wall on the opposite side appeared to be completely collapsed  There is no formal cavity  The cyst wall appeared to be healthy  There are no evidence of residual cyst  Using a large snare was able to grasp the stent successfully  Remove the stent without difficulty  Afterwards I evaluated the site which appeared to be within normal limits  The remainder of the gastric lumen was normal   Duodenum: The first portion of the duodenum was normal  In the second portion stalk polyp was appreciated  Polyp measured approximately 2 cm with a long stalk   I was able to put a snare around this polyp and remove it  There was some mild ooze from the polyp stalk after removal  The one Endo Clip was placed to control any bleeding  There is no other bleeding seen  Condition: stable  EBL: None  Post-operative Diagnosis: Duodenal polyp removed  Cyst gastrostomy stent removed  There is resolution of the cyst cavity at this point  Recommendations:  1) Follow up polyp pathology  2) Advance diet as tolerated  3) I explained that this recurrence can occur  She can be followed in the office; please call for appointment    DIAGNOSIS :  A  DUODENUM POLYPECTOMY:  Hamartomatous polyp, see note  Negative for dysplasia and malignancy  CT ABDOMEN AND PELVIS WITH IV CONTRAST  INDICATION:   Nausea/vomiting  Epigastric pain  Low thoracic/upper abdominal pain/tenderness with vomiting, leukocytosis and bandemia  History of prior pancreatitis-lipase pending  COMPARISON:  CT abdomen and pelvis 7/12/2017  TECHNIQUE:  CT examination of the abdomen and pelvis was performed  Axial, sagittal, and coronal 2D reformatted images were created from the source data and submitted for interpretation  Radiation dose length product (DLP) for this visit:  307 mGy-cm   This examination, like all CT scans performed in the [facility], was performed utilizing techniques to minimize radiation dose exposure, including the use of iterative reconstruction and automated exposure control  IV Contrast:  85 mL of iohexol (OMNIPAQUE)  350  Enteric Contrast:  Enteric contrast was not administered  FINDINGS:  ABDOMEN  LOWER CHEST:  Small sliding hiatal hernia  Minimal fluid in the distal esophagus attributed to gastroesophageal reflux  LIVER/BILIARY TREE:  Mild prominence of the central intrahepatic and proximal common bile duct; proximal CBD measures 9 mm and tapers normally towards the ampulla  Liver otherwise unremarkable  GALLBLADDER:  Potential tiny noncalcified gallstone image 27 series 2  Gallbladder is mildly distended  Trace pericholecystic fluid is nonspecific  SPLEEN:  Unremarkable  PANCREAS:  Progressive severe atrophy of the body and tail of the pancreas  Prior pancreatic pseudocyst has resolved  No pancreatic duct dilation  Mildly edematous head and uncinate process of the pancreas with trace adjacent stranding  No evidence of   acute pancreatic necrosis or acute peripancreatic fluid collection  ADRENAL GLANDS:  Unremarkable  KIDNEYS/URETERS:  Stable 6 mm left renal posterior interpolar exophytic hyperdense nodule unchanged as far back as July 2017  Otherwise unremarkable  No perinephric collection  STOMACH AND BOWEL:  Colonic diverticulosis and second duodenal segment diverticulum without diverticulitis  Mild thickening of the proximal duodenal sweep and distal stomach likely reactive  No bowel obstruction  APPENDIX:  A normal appendix was visualized  ABDOMINOPELVIC CAVITY:  Trace streaky peripancreatic and paraduodenal free fluid  No abscess or significant peripancreatic fluid collection  No free gas or enlarged lymph nodes  VESSELS:  Aortoiliac calcification  No aneurysm  Stable mildly prominent left ventral mesenteric collateral vessels  Stable prominent left ovarian and bilateral adnexal vessels  PELVIS  REPRODUCTIVE ORGANS:  Unremarkable for patient's age  URINARY BLADDER:  Unremarkable  ABDOMINAL WALL/INGUINAL REGIONS:  Unremarkable  OSSEOUS STRUCTURES:  No acute fracture or destructive osseous lesion  IMPRESSION:  Acute interstitial pancreatitis  No evidence of acute pancreatic necrosis or significant peripancreatic fluid collection  Distended gallbladder with potential tiny noncalcified gallstone  Mild dilation of the proximal CBD and 9 mm tapering normally towards the pancreas  No radiopaque choledocholithiasis  Findings may be due to spasm at the ampulla or periampullary edema  Consider follow-up with MRI/MRCP if clinically warranted  Small sliding hiatal hernia   Fluid in the distal esophagus attributed to gastroesophageal reflux          MRI OF THE ABDOMEN WITHOUT CONTRAST WITH MRCP  INDICATION: 76 years / Female  Has acute pancreatitis, likely due to cholelithiasis, rule out CBD/bile duct stones  COMPARISON: Abdomen and pelvic CT from 3/6/2022  TECHNIQUE:  MRI of the abdomen was performed without the administration of contrast using the following  using sequences: Axial T1 weighted in/out of phase images, multiplanar T2 weighted images, diffusion weighted and fat suppressed T1 weighted images  3D MRCP images were obtained with radial thick slabs and projections  3D rendering was performed from the acquisition scanner  FINDINGS:  LOWER CHEST:   Unremarkable  LIVER:  Normal in size and configuration  No suspicious mass  The hepatic veins and portal veins are unremarkable on this noncontrast MR  BILE DUCTS:  No intrahepatic or extrahepatic bile duct dilation  Common bile duct is mildly prominent at 7 mm, but there is no evidence of choledocholithiasis, biliary stricture or suspicious mass  GALLBLADDER:  Nonspecific diffuse gallbladder wall thickening to 5 mm  There are probable several tiny gallstones in the gallbladder (series 15 image 73-77 )  No pericholecystic fat stranding to suggest acute cholecystitis  PANCREAS:  Pancreatic tail is atrophic  Again seen is mild peripancreatic fat stranding around the pancreatic head and neck consistent with acute pancreatitis  ADRENAL GLANDS:  Normal   SPLEEN:  Normal   KIDNEYS/PROXIMAL URETERS:  No hydroureteronephrosis  No suspicious renal mass  BOWEL:  No dilated loops of bowel  PERITONEAL CAVITY/RETROPERITONEUM:  No ascites  No mass  LYMPH NODES:  No abdominal lymphadenopathy  VASCULAR STRUCTURES:  Unremarkable  No aneurysm  ABDOMINAL WALL:  Unremarkable  OSSEOUS STRUCTURES:  No suspicious osseous lesion    IMPRESSION:  Again seen is mild peripancreatic fat stranding around the pancreatic head and neck consistent with acute pancreatitis  Nonspecific diffuse gallbladder wall thickening to 5 mm  There are probable several tiny gallstones in the gallbladder (series 15 image [id number] )  No pericholecystic fat stranding to suggest acute cholecystitis  There is no evidence of choledocholithiasis        ASSESSMENT/PLAN:     1  Acute pancreatitis, appears recurrent (experienced severe episode with necrosis and pseudocyst requiring cystogastrostomy in 2016); suspect to be related to alcohol use although possible patient may have passed small biliary stones  Her MRCP shows no evidence of residual choledocholithiasis but she does have intraluminal gallstones      - continue IV fluids for now; will advance to clear liquids, and then to low-fat diet as patient clinically improves    - check triglycerides    - advised patient to avoid alcohol use going forward, given her substantial pancreatitis history    - would recommend General surgery consultation, regarding the prospect of cholecystectomy        2  History of colon polyps, patient reports she had a polyp removed on her last colonoscopy around 2017; no alarm symptoms at this time otherwise    -recommend nonurgent outpatient colonoscopy sometime this year for surveillance      The patient was seen and examined by Dr Raciel Melvin, all rayo medical decisions were made with Dr Raciel Melvin  Thank you for allowing us to participate in the care of this pleasant patient  We will follow up with you closely

## 2022-03-08 VITALS
HEIGHT: 62 IN | DIASTOLIC BLOOD PRESSURE: 78 MMHG | HEART RATE: 73 BPM | TEMPERATURE: 97.8 F | OXYGEN SATURATION: 97 % | SYSTOLIC BLOOD PRESSURE: 146 MMHG | BODY MASS INDEX: 19.23 KG/M2 | WEIGHT: 104.5 LBS | RESPIRATION RATE: 18 BRPM

## 2022-03-08 PROBLEM — R65.10 SIRS (SYSTEMIC INFLAMMATORY RESPONSE SYNDROME) (HCC): Status: RESOLVED | Noted: 2022-03-06 | Resolved: 2022-03-08

## 2022-03-08 LAB
ALBUMIN SERPL BCP-MCNC: 2.4 G/DL (ref 3.5–5)
ALP SERPL-CCNC: 221 U/L (ref 46–116)
ALT SERPL W P-5'-P-CCNC: 17 U/L (ref 12–78)
ANION GAP SERPL CALCULATED.3IONS-SCNC: 7 MMOL/L (ref 4–13)
AST SERPL W P-5'-P-CCNC: 31 U/L (ref 5–45)
BASOPHILS # BLD AUTO: 0.09 THOUSANDS/ΜL (ref 0–0.1)
BASOPHILS NFR BLD AUTO: 1 % (ref 0–1)
BILIRUB SERPL-MCNC: 0.41 MG/DL (ref 0.2–1)
BUN SERPL-MCNC: 24 MG/DL (ref 5–25)
CALCIUM ALBUM COR SERPL-MCNC: 10.2 MG/DL (ref 8.3–10.1)
CALCIUM SERPL-MCNC: 8.9 MG/DL (ref 8.3–10.1)
CHLORIDE SERPL-SCNC: 107 MMOL/L (ref 100–108)
CO2 SERPL-SCNC: 25 MMOL/L (ref 21–32)
CREAT SERPL-MCNC: 1.25 MG/DL (ref 0.6–1.3)
EOSINOPHIL # BLD AUTO: 0.2 THOUSAND/ΜL (ref 0–0.61)
EOSINOPHIL NFR BLD AUTO: 3 % (ref 0–6)
ERYTHROCYTE [DISTWIDTH] IN BLOOD BY AUTOMATED COUNT: 18.6 % (ref 11.6–15.1)
GFR SERPL CREATININE-BSD FRML MDRD: 41 ML/MIN/1.73SQ M
GLUCOSE SERPL-MCNC: 100 MG/DL (ref 65–140)
HCT VFR BLD AUTO: 31.3 % (ref 34.8–46.1)
HGB BLD-MCNC: 10.3 G/DL (ref 11.5–15.4)
IMM GRANULOCYTES # BLD AUTO: 0.07 THOUSAND/UL (ref 0–0.2)
IMM GRANULOCYTES NFR BLD AUTO: 1 % (ref 0–2)
LIPASE SERPL-CCNC: 117 U/L (ref 73–393)
LYMPHOCYTES # BLD AUTO: 0.78 THOUSANDS/ΜL (ref 0.6–4.47)
LYMPHOCYTES NFR BLD AUTO: 12 % (ref 14–44)
MCH RBC QN AUTO: 30.2 PG (ref 26.8–34.3)
MCHC RBC AUTO-ENTMCNC: 32.9 G/DL (ref 31.4–37.4)
MCV RBC AUTO: 92 FL (ref 82–98)
MONOCYTES # BLD AUTO: 0.84 THOUSAND/ΜL (ref 0.17–1.22)
MONOCYTES NFR BLD AUTO: 13 % (ref 4–12)
NEUTROPHILS # BLD AUTO: 4.54 THOUSANDS/ΜL (ref 1.85–7.62)
NEUTS SEG NFR BLD AUTO: 70 % (ref 43–75)
NRBC BLD AUTO-RTO: 0 /100 WBCS
PLATELET # BLD AUTO: 413 THOUSANDS/UL (ref 149–390)
PMV BLD AUTO: 9.6 FL (ref 8.9–12.7)
POTASSIUM SERPL-SCNC: 4.3 MMOL/L (ref 3.5–5.3)
PROT SERPL-MCNC: 7.9 G/DL (ref 6.4–8.2)
RBC # BLD AUTO: 3.41 MILLION/UL (ref 3.81–5.12)
SODIUM SERPL-SCNC: 139 MMOL/L (ref 136–145)
TRIGL SERPL-MCNC: 153 MG/DL
WBC # BLD AUTO: 6.52 THOUSAND/UL (ref 4.31–10.16)

## 2022-03-08 PROCEDURE — 99232 SBSQ HOSP IP/OBS MODERATE 35: CPT | Performed by: PHYSICIAN ASSISTANT

## 2022-03-08 PROCEDURE — 83690 ASSAY OF LIPASE: CPT | Performed by: FAMILY MEDICINE

## 2022-03-08 PROCEDURE — 84478 ASSAY OF TRIGLYCERIDES: CPT | Performed by: STUDENT IN AN ORGANIZED HEALTH CARE EDUCATION/TRAINING PROGRAM

## 2022-03-08 PROCEDURE — 80053 COMPREHEN METABOLIC PANEL: CPT | Performed by: FAMILY MEDICINE

## 2022-03-08 PROCEDURE — 85025 COMPLETE CBC W/AUTO DIFF WBC: CPT | Performed by: FAMILY MEDICINE

## 2022-03-08 PROCEDURE — 99239 HOSP IP/OBS DSCHRG MGMT >30: CPT | Performed by: INTERNAL MEDICINE

## 2022-03-08 RX ORDER — HEPARIN SODIUM 5000 [USP'U]/ML
5000 INJECTION, SOLUTION INTRAVENOUS; SUBCUTANEOUS EVERY 8 HOURS SCHEDULED
Status: DISCONTINUED | OUTPATIENT
Start: 2022-03-09 | End: 2022-03-08 | Stop reason: HOSPADM

## 2022-03-08 RX ADMIN — OMEGA-3 FATTY ACIDS CAP 1000 MG 1000 MG: 1000 CAP at 08:56

## 2022-03-08 RX ADMIN — SODIUM CHLORIDE, SODIUM LACTATE, POTASSIUM CHLORIDE, AND CALCIUM CHLORIDE 75 ML/HR: .6; .31; .03; .02 INJECTION, SOLUTION INTRAVENOUS at 06:19

## 2022-03-08 RX ADMIN — ENOXAPARIN SODIUM 40 MG: 40 INJECTION SUBCUTANEOUS at 08:56

## 2022-03-08 RX ADMIN — PANTOPRAZOLE SODIUM 40 MG: 40 TABLET, DELAYED RELEASE ORAL at 05:22

## 2022-03-08 NOTE — ASSESSMENT & PLAN NOTE
· Present on admission with leukocytosis and mild tachycardia  · Likely cause:  Acute pancreatitis  · Leukocytosis has improved off Abx

## 2022-03-08 NOTE — ASSESSMENT & PLAN NOTE
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, patient eventually underwent cysto gastrostomy that same month  In October 2017, patient again had acute pancreatitis, etiology unspecified with no infection or necrosis at that time  Patient eventually underwent an endoscopic ultrasound which revealed pancreatic cyst, suggestive of pseudocyst, no necrosis, no gallbladder stones, no bile duct stones or masses with normal pancreatic duct  The above history were found at Care [facility] under the [facility]  It is also important to note that in 1 of the notes of her doctors, there was a diagnosis also of chronic pancreatitis  · CT scan done today revealed acute interstitial pancreatitis without evidence of necrosis or significant peripancreatic fluid collection  Patient was also found to have distended gallbladder with potential tiny noncalcified gallstone  There is a mild dilatation of the proximal CBD at 9 mm  No radiopaque choledocholithiasis  Findings may be due to spasm at the ampulla or periampullary edema  Consider MRI/MRCP if clinically warranted  · IV fluid hydration  From my discussion with the patient and from my review of patient's previous record, when patient had the pancreatitis in 2016, patient developed IV fluid induced CHF  Thus for a time patient was on Lasix    Echocardiogram at that time was essentially normal except for a mild diastolic dysfunction, mild pulmonary hypertension, mild tricuspid valve and mitral valve regurgitation  According to the patient, her primary care physician, Dr Cassandra Doherty, told her, that she the did have CHF and that the congestion at that time just happened with the aggressive IV fluids  Thus patient was eventually taken off from diuretic medications  Thus, from my discussion with the patient, she is okay for now with a rate of 100 mL/hour as far as her IV fluid rate is concerned  She is okay adjusting this accordingly, particularly if her pancreatitis is not improving/worsening  · MRCP: Nonspecific diffuse gallbladder wall thickening to 5 mm  There are probable several tiny gallstones in the gallbladder (series 15 image [id number] )  No pericholecystic fat stranding to suggest acute cholecystitis  · Patient tolerated advancement in diet without any complications  Lipase now down to within normal limits  Lipids WNL  Plan:  · Continue to monitor diet, recommend lo-fat on discharge  · GI signed off, recommend follow up with Gen Surg  · General Sx Consulted: Discussed option of Lap-Niharika prior to d/c v  Outpatient with possibility for recurrence of s/s  Pt and surgeon agree that would be better to be done outpatient  Follow up information provided

## 2022-03-08 NOTE — ASSESSMENT & PLAN NOTE
· Has history of essential hypertension, however, not on any medications anymore as she claims her blood pressures were constantly normal, since she had bouts of pancreatitis in the past   · Recently, she told me, that her systolic blood pressures at home have been running high at times in the 170s  When I saw the patient, her blood pressure is normal   · Possible fluctuations in patient's blood pressures today due to patient's pain and some anxiety  · Patient used to take lisinopril with hydrochlorothiazide in the past     Blood Pressure: 146/78    Plan:  · Stable  · Recommend following up with PCP for continued management

## 2022-03-08 NOTE — DISCHARGE SUMMARY
[facility]  Discharge- Nichol Doherty 1945, 76 y o  female MRN: [id number]  Unit/Bed#: W -01 Encounter: [id number]  Primary Care Provider: Edward Yepez MD   Date and time admitted to hospital: 3/6/2022  6:25 AM    * 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  Assessment & Plan  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, patient eventually underwent cysto gastrostomy that same month  In October 2017, patient again had acute pancreatitis, etiology unspecified with no infection or necrosis at that time  Patient eventually underwent an endoscopic ultrasound which revealed pancreatic cyst, suggestive of pseudocyst, no necrosis, no gallbladder stones, no bile duct stones or masses with normal pancreatic duct  The above history were found at Care everywhere under the [address]  It is also important to note that in 1 of the notes of her doctors, there was a diagnosis also of chronic pancreatitis  · CT scan done today revealed acute interstitial pancreatitis without evidence of necrosis or significant peripancreatic fluid collection  Patient was also found to have distended gallbladder with potential tiny noncalcified gallstone  There is a mild dilatation of the proximal CBD at 9 mm  No radiopaque choledocholithiasis    Findings may be due to spasm at the ampulla or periampullary edema  Consider MRI/MRCP if clinically warranted  · IV fluid hydration  From my discussion with the patient and from my review of patient's previous record, when patient had the pancreatitis in 2016, patient developed IV fluid induced CHF  Thus for a time patient was on Lasix  Echocardiogram at that time was essentially normal except for a mild diastolic dysfunction, mild pulmonary hypertension, mild tricuspid valve and mitral valve regurgitation  According to the patient, her primary care physician, Dr Narayan Pedroza, told her, that she the did have CHF and that the congestion at that time just happened with the aggressive IV fluids  Thus patient was eventually taken off from diuretic medications  Thus, from my discussion with the patient, she is okay for now with a rate of 100 mL/hour as far as her IV fluid rate is concerned  She is okay adjusting this accordingly, particularly if her pancreatitis is not improving/worsening  · MRCP: Nonspecific diffuse gallbladder wall thickening to 5 mm  There are probable several tiny gallstones in the gallbladder (series 15 image 73-77 )  No pericholecystic fat stranding to suggest acute cholecystitis  · Patient tolerated advancement in diet without any complications  Lipase now down to within normal limits  Lipids WNL  Plan:  · Continue to monitor diet, recommend lo-fat on discharge  · GI signed off, recommend follow up with Gen Surg  · General Sx Consulted: Discussed option of Lap-Niharika prior to d/c v  Outpatient with possibility for recurrence of s/s  Pt and surgeon agree that would be better to be done outpatient  Follow up information provided  SIRS (systemic inflammatory response syndrome) (HCC)-resolved as of 3/8/2022  Assessment & Plan  · Present on admission with leukocytosis and mild tachycardia  · Likely cause:  Acute pancreatitis  · Leukocytosis has improved off Abx             Primary hypertension  Assessment & Plan  · Has history of essential hypertension, however, not on any medications anymore as she claims her blood pressures were constantly normal, since she had bouts of pancreatitis in the past   · Recently, she told me, that her systolic blood pressures at home have been running high at times in the 170s  When I saw the patient, her blood pressure is normal   · Possible fluctuations in patient's blood pressures today due to patient's pain and some anxiety  · Patient used to take lisinopril with hydrochlorothiazide in the past     Blood Pressure: 146/78    Plan:  · Stable  · Recommend following up with PCP for continued management  Gastroesophageal reflux disease  Assessment & Plan  · In the past, patient was prescribed with proton pump inhibitor, however, patient discontinued this as she read that it can cause low magnesium  · CT scan of the abdomen done today also revealed a small sliding hiatal hernia and fluid in the distal esophagus attributed to gastroesophageal reflux  Plan:  · Can resume Omeprazole on discharge  · Dietary recommendations discussed  Medical Problems             Resolved Problems  Date Reviewed: 3/8/2022          Resolved    SIRS (systemic inflammatory response syndrome) ([facility] [address] ) 3/8/2022     Resolved by  Christi Watson DO              Discharging Physician / Practitioner: Christi aWtson DO  PCP: Henny Guerra MD  Admission Date:   Admission Orders (From admission, onward)     Ordered        03/06/22 1155  INPATIENT ADMISSION  Once                      Discharge Date: 03/08/22    Consultations During Hospital Stay:  · GI  · General Surgery    Procedures Performed:   · CT A/P  · MRCP    Significant Findings / Test Results:   · CT A/P: Acute interstitial pancreatitis  No evidence of acute pancreatic necrosis or significant peripancreatic fluid collection  Distended gallbladder with potential tiny noncalcified gallstone   Mild dilation of the proximal CBD and 9 mm tapering normally towards the pancreas  No radiopaque choledocholithiasis  Findings may be due to spasm at the ampulla or periampullary edema  Consider follow-up with MRI/MRCP if clinically warranted  · MRCP: Again seen is mild peripancreatic fat stranding around the pancreatic head and neck consistent with acute pancreatitis  Nonspecific diffuse gallbladder wall thickening to 5 mm  There are probable several tiny gallstones in the gallbladder (series 15 image 73-77 )  No pericholecystic fat stranding to suggest acute cholecystitis  There is no evidence of choledocholithiasis  Incidental Findings:   · None    Test Results Pending at Discharge (will require follow up): · None     Outpatient Tests Requested:  · None    Complications:  None    Reason for Admission: None    Hospital Course:   Laura Engle is a 76 y o  female patient who originally presented to the hospital on 3/6/2022 due to  pain localized at the right scapular/shoulder area  She then had episodes of nausea and vomiting  She told me she vomited 3 times  Pain then localized in the Epigastric region  Patient presents to the ED and had CT abdomen done which showed acute interstitial pancreatitis without evidence of necrosis or peripancreatic fluid collection as above  Patient had lab work done significant for elevated lipase at 23,000  Patient was started on IV fluids she was made NPO and GI consult was placed  They recommended MRCP which was done showed non diffuse gallbladder wall thickening to 5 mm probable several tiny gallstones verified acute pancreatitis  General surgery was consulted and reviewed case discussed with patient her options to do possible laparoscopic cholecystectomy either while admitted or as an outpatient if she continued to improve  Patient was monitored advancement in diet to clear liquids  She tolerated well lipase continued to downtrend  Diet was advanced to low-fat    Patient continues to tolerate p o  intake, had a bowel movement without any blood  Patient's pain resolved and vital signs stable  GI and General surgery agree patient able to follow-up as an outpatient to discuss further interventions  Patient stable for discharge      Please see above list of diagnoses and related plan for additional information  Condition at Discharge: fair    Discharge Day Visit / Exam:   Subjective:  Patient seen and examined on day of discharge sitting comfortably up in her chair  She was ready to have breakfast   Patient states she has no abdominal pain, she had a BM yesterday that was formed  With no blood  Patient denies any fevers, chills, nausea, vomiting, diarrhea  She states she will have to go back to work soon would need a few more days off to recover, work note provided  Vitals: Blood Pressure: 146/78 (03/08/22 0709)  Pulse: 73 (03/08/22 0709)  Temperature: 97 8 °F (36 6 °C) (03/08/22 0709)  Temp Source: Oral (03/07/22 1429)  Respirations: 18 (03/08/22 0709)  Height: 5' 2" (157 5 cm) (03/06/22 0626)  Weight - Scale: 47 4 kg (104 lb 8 oz) (03/06/22 0626)  SpO2: 97 % (03/08/22 0709)  Exam:   Physical Exam  Vitals and nursing note reviewed  Constitutional:       General: She is not in acute distress  Appearance: Normal appearance  HENT:      Head: Normocephalic and atraumatic  Right Ear: External ear normal       Left Ear: External ear normal       Nose: Nose normal  No congestion  Mouth/Throat:      Mouth: Mucous membranes are moist       Pharynx: No oropharyngeal exudate  Eyes:      Extraocular Movements: Extraocular movements intact  Conjunctiva/sclera: Conjunctivae normal       Pupils: Pupils are equal, round, and reactive to light  Cardiovascular:      Rate and Rhythm: Normal rate and regular rhythm  Pulses: Normal pulses  Heart sounds: Normal heart sounds  No murmur heard  Pulmonary:      Effort: Pulmonary effort is normal       Breath sounds: Normal breath sounds  No wheezing or rales  Abdominal:      General: Bowel sounds are normal  There is no distension  Palpations: Abdomen is soft  Tenderness: There is no abdominal tenderness  There is no guarding or rebound  Musculoskeletal:         General: Normal range of motion  Cervical back: Normal range of motion  Right lower leg: No edema  Lymphadenopathy:      Cervical: No cervical adenopathy  Skin:     General: Skin is warm  Capillary Refill: Capillary refill takes less than 2 seconds  Coloration: Skin is not jaundiced  Findings: No bruising or erythema  Neurological:      General: No focal deficit present  Mental Status: She is alert and oriented to person, place, and time  Motor: No weakness  Psychiatric:         Mood and Affect: Mood normal          Behavior: Behavior normal           Discussion with Family: Patient declined call to   Discharge instructions/Information to patient and family:   See after visit summary for information provided to patient and family  Provisions for Follow-Up Care:  See after visit summary for information related to follow-up care and any pertinent home health orders  Disposition:   Home    Planned Readmission: None     Discharge Statement:  I spent 45 minutes discharging the patient  This time was spent on the day of discharge  I had direct contact with the patient on the day of discharge  Greater than 50% of the total time was spent examining patient, answering all patient questions, arranging and discussing plan of care with patient as well as directly providing post-discharge instructions  Additional time then spent on discharge activities  Discharge Medications:  See after visit summary for reconciled discharge medications provided to patient and/or family        **Please Note: This note may have been constructed using a voice recognition system**

## 2022-03-08 NOTE — ASSESSMENT & PLAN NOTE
· In the past, patient was prescribed with proton pump inhibitor, however, patient discontinued this as she read that it can cause low magnesium  · CT scan of the abdomen done today also revealed a small sliding hiatal hernia and fluid in the distal esophagus attributed to gastroesophageal reflux  Plan:  · Can resume Omeprazole on discharge  · Dietary recommendations discussed

## 2022-03-08 NOTE — DISCHARGE INSTR - AVS FIRST PAGE
Dear Hedy Harden,     It was our pleasure to care for you here at [facility]  It is our hope that we were always able to exceed the expected standards for your care during your stay  You were hospitalized due to ***  You were cared for on the *** floor by Ortiz Rogers DO under the service of Jewels Lovelace MD with the [facility] who covers for your primary care physician (PCP), Tres Queen MD, while you were hospitalized  If you have any questions or concerns related to this hospitalization, you may contact us at [phone]  For follow up as well as any medication refills, we recommend that you follow up with your primary care physician  A registered nurse will reach out to you by phone within a few days after your discharge to answer any additional questions that you may have after going home  However, at this time we provide for you here, the most important instructions / recommendations at discharge:     Notable Medication Adjustments -   We have made no changes to your medications  Testing Required after Discharge -   None  Important follow up information -   Please follow up with your primary care provider within 1-2 weeks to discuss recent hospitalization  Please follow up with General Surgery to make an appointment and discuss thought of possible surgery  Other Instructions -   Please return to ED if you have any further chest pain, abdominal pain, shortness of breath, nausea, vomiting diarrhea or high fevers  Please review this entire after visit summary as additional general instructions including medication list, appointments, activity, diet, any pertinent wound care, and other additional recommendations from your care team that may be provided for you        Sincerely,     Ortiz Rogers DO

## 2022-03-08 NOTE — PROGRESS NOTES
Progress Note - Autumn Lomas 76 y o  female MRN: [id number]    Unit/Bed#: W -01 Encounter: [id number]    Assessment and Plan:   Principal Problem:    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  Active Problems:    SIRS (systemic inflammatory response syndrome) (HCC)    Primary hypertension    Gastroesophageal reflux disease      #1  Acute pancreatitis: appears recurrent (experienced severe episode with necrosis and pseudocyst requiring cystogastrostomy in 2016); could be related to alcohol use although possible patient may have passed small biliary stones  Her MRCP shows no evidence of residual choledocholithiasis but she does have intraluminal gallstones    -tolerating low fat diet at this time  -recommend outpatient general surgery consult for cholecystectomy  -recommend avoiding all alcohol use  -can be d/c from GI standpoint     #2  Colon cancer screening: reviewed recent colonoscopy in 2017 with Dr Portia Vines, no polyps found, has no hx of polyps prior to that    -due to age and normal colonoscopy at age 74, no repeat colonoscopy recommended at this time for screening  ----------------------------------------------------------------------------------------------------------------    Subjective:     Reports feeling well, no pain, no N/V, moving bowels, tolerated low fat diet for breakfast    Objective:     Vitals: Blood pressure 146/78, pulse 73, temperature 97 8 °F (36 6 °C), resp  rate 18, height 5' 2" (1 575 m), weight 47 4 kg (104 lb 8 oz), SpO2 97 %  ,Body mass index is 19 11 kg/m²        Intake/Output Summary (Last 24 hours) at 3/8/2022 1158  Last data filed at 3/8/2022 0854  Gross per 24 hour   Intake 1887 5 ml   Output 2 ml   Net 1885 5 ml       Physical Exam:     General Appearance: Alert, appears stated age and cooperative  Lungs: Clear to auscultation bilaterally, no rales or rhonchi, no labored breathing/accessory muscle use  Heart: Regular rate and rhythm, S1, S2 normal, no murmur, click, rub or gallop  Abdomen: Soft, non-tender, non-distended; bowel sounds normal; no masses or no organomegaly  Extremities: No cyanosis, clubbing, or edema    Invasive Devices  Report    Peripheral Intravenous Line            Peripheral IV 03/07/22 Dorsal (posterior); Right Forearm 1 day                Lab Results:  Results from last 7 days   Lab Units 03/08/22  0506   WBC Thousand/uL 6 52   HEMOGLOBIN g/dL 10 3*   HEMATOCRIT % 31 3*   PLATELETS Thousands/uL 413*   NEUTROS PCT % 70   LYMPHS PCT % 12*   MONOS PCT % 13*   EOS PCT % 3     Results from last 7 days   Lab Units 03/08/22  0506   POTASSIUM mmol/L 4 3   CHLORIDE mmol/L 107   CO2 mmol/L 25   BUN mg/dL 24   CREATININE mg/dL 1 25   CALCIUM mg/dL 8 9   ALK PHOS U/L 221*   ALT U/L 17   AST U/L 31     Invalid input(s): BILI  Results from last 7 days   Lab Units 03/06/22  0732   INR  0 96     Results from last 7 days   Lab Units 03/08/22  0506   LIPASE u/L 117       Imaging Studies: I have personally reviewed pertinent imaging studies  XR chest 1 view portable    Result Date: 3/6/2022  Impression: No acute cardiopulmonary disease  Workstation performed: [id number]     MRI abdomen wo contrast and mrcp    Result Date: 3/6/2022  Impression: Again seen is mild peripancreatic fat stranding around the pancreatic head and neck consistent with acute pancreatitis  Nonspecific diffuse gallbladder wall thickening to 5 mm  There are probable several tiny gallstones in the gallbladder (series 15 image 73-77 )  No pericholecystic fat stranding to suggest acute cholecystitis  There is no evidence of choledocholithiasis  Workstation performed: [id number]     CT abdomen pelvis with contrast    Result Date: 3/6/2022  Impression: Acute interstitial pancreatitis  No evidence of acute pancreatic necrosis or significant peripancreatic fluid collection  Distended gallbladder with potential tiny noncalcified gallstone   Mild dilation of the proximal CBD and 9 mm tapering normally towards the pancreas  No radiopaque choledocholithiasis  Findings may be due to spasm at the ampulla or periampullary edema  Consider follow-up with MRI/MRCP if clinically warranted  Small sliding hiatal hernia  Fluid in the distal esophagus attributed to gastroesophageal reflux  This study demonstrates a significant  finding and was documented as such in [facility] for liaison and referring practitioner notification   Workstation performed: [id number]

## 2022-03-21 ENCOUNTER — OFFICE VISIT (OUTPATIENT)
Dept: SURGERY | Facility: CLINIC | Age: 77
End: 2022-03-21
Payer: COMMERCIAL

## 2022-03-21 VITALS
BODY MASS INDEX: 19.32 KG/M2 | DIASTOLIC BLOOD PRESSURE: 84 MMHG | WEIGHT: 105 LBS | HEIGHT: 62 IN | HEART RATE: 85 BPM | SYSTOLIC BLOOD PRESSURE: 162 MMHG

## 2022-03-21 DIAGNOSIS — K85.90 ACUTE PANCREATITIS: ICD-10-CM

## 2022-03-21 DIAGNOSIS — K80.20 GALL STONES: ICD-10-CM

## 2022-03-21 PROCEDURE — 99213 OFFICE O/P EST LOW 20 MIN: CPT | Performed by: SURGERY

## 2022-03-21 NOTE — PROGRESS NOTES
Assessment/Plan:  Patient was hospitalized at [facility] in 2016 with acute pancreatitis and pancreatic necrosis  Ultrasound that time did not reveal gallstones, etiology was placed on her alcohol use  She was recently hospitalized with gallstone pancreatitis  Ultrasound reveals cholelithiasis  MRCP was negative  Abdominal symptoms improved  She is no longer imbibing alcohol  Her main complaint is continued upper back discomfort  This is presumed secondary to pancreatitis resolution  Interval laparoscopic cholecystectomy is recommended  Risks and benefits described  She has increased risk for open cholecystectomy  This was discussed  All questions answered  Diagnoses and all orders for this visit:    Acute pancreatitis  -     Ambulatory Referral to General Surgery    Gall stones  -     Ambulatory Referral to General Surgery        Subjective:      Patient ID: Patrica Velasquez is a 76 y o  female  Patient presents for gallbladder consult  Hospitalized 3/6/2022 to 3/8/2022 for acute pancreatitis and gallstones  States she had one episode of right upper shoulder blade pain and vomiting on 3/6/2022 and she went to the hospital   CT abdomen pelvis 3/6/2022 and MRI abdomen and mrcp 3/6/2022   IMPRESSION:  Again seen is mild peripancreatic fat stranding around the pancreatic head and neck consistent with acute pancreatitis  Nonspecific diffuse gallbladder wall thickening to 5 mm  There are probable several tiny gallstones in the gallbladder (series 15 image 73-77 )  No pericholecystic fat stranding to suggest acute cholecystitis  There is no evidence of choledocholithiasis          Abdominal Pain  The current episode started 1 to 4 weeks ago  The problem occurs intermittently  The problem has been unchanged  The quality of the pain is a sensation of fullness  Associated symptoms include arthralgias, flatus and vomiting  Nothing aggravates the pain  The pain is relieved by nothing   Prior diagnostic workup includes CT scan  Her past medical history is significant for gallstones  The following portions of the patient's history were reviewed and updated as appropriate:     She  has a past medical history of Basal cell carcinoma, Hypertension, and Pancreatitis  She  has a past surgical history that includes Tubal ligation and [place] tooth extraction  Her family history includes Heart attack in her father; Lung cancer in her mother  She  reports that she quit smoking about 14 years ago  Her smoking use included cigarettes  She has never used smokeless tobacco  She reports current alcohol use  No history on file for drug use  Current Outpatient Medications   Medication Sig Dispense Refill    esomeprazole (NexIUM) 40 MG capsule Take 40 mg by mouth every morning before breakfast      fluocinolone acetonide (DERMOTIC) 0 01 % otic oil Administer 2 drops into both ears daily at bedtime as needed (when itchy) for up to 180 days 1 Bottle 1    mometasone (ELOCON) 0 1 % lotion One drop affected ear canal daily at bedtime when necessary itching 60 mL 0    Omega-3 Fatty Acids (FISH OIL PO) Take 1 g by mouth      omeprazole (PriLOSEC) 20 mg delayed release capsule Take 20 mg by mouth daily       No current facility-administered medications for this visit  She is allergic to pollen extract       Review of Systems   Constitutional: Negative  Negative for activity change  HENT: Negative  Eyes: Negative  Respiratory: Negative  Cardiovascular: Negative  Gastrointestinal: Positive for abdominal pain, flatus and vomiting  Endocrine: Negative  Genitourinary: Negative  Musculoskeletal: Positive for arthralgias  Skin: Negative  Allergic/Immunologic: Positive for environmental allergies  Neurological: Negative  Psychiatric/Behavioral: Negative for agitation, behavioral problems and confusion  The patient is not nervous/anxious  All other systems reviewed and are negative  Objective:      /84   Pulse 85   Ht 5' 2" (1 575 m)   Wt 47 6 kg (105 lb)   BMI 19 20 kg/m²          Physical Exam  Constitutional:       Appearance: Normal appearance  She is well-developed  HENT:      Head: Normocephalic and atraumatic  Nose: Nose normal    Eyes:      Extraocular Movements: Extraocular movements intact  Conjunctiva/sclera: Conjunctivae normal    Cardiovascular:      Rate and Rhythm: Normal rate and regular rhythm  Heart sounds: Normal heart sounds  Pulmonary:      Effort: Pulmonary effort is normal       Breath sounds: Normal breath sounds  Abdominal:      General: Abdomen is flat  Tenderness: There is no abdominal tenderness  Hernia: No hernia is present  Musculoskeletal:         General: Normal range of motion  Cervical back: Normal range of motion  Skin:     General: Skin is warm and dry  Neurological:      Mental Status: She is alert and oriented to person, place, and time     Psychiatric:         Mood and Affect: Mood normal

## 2022-03-22 ENCOUNTER — PREP FOR PROCEDURE (OUTPATIENT)
Dept: SURGERY | Facility: CLINIC | Age: 77
End: 2022-03-22

## 2022-03-22 DIAGNOSIS — K85.10 GALLSTONE PANCREATITIS: Primary | ICD-10-CM

## 2022-04-12 ENCOUNTER — ANESTHESIA EVENT (OUTPATIENT)
Dept: PERIOP | Facility: HOSPITAL | Age: 77
End: 2022-04-12
Payer: COMMERCIAL

## 2022-04-18 NOTE — DISCHARGE INSTR - AVS FIRST PAGE
Please call the office when you leave to schedule an appointment for 2 weeks  Please call [phone]                      [address] [address]  [address], [address], [place], [address]  [address] of [address] between [facility] and [facility]  Activity:    May lift 10 lb as many times as desired the 1st week,       20 lb in 2 weeks,       30 lb in 3 weeks  Walking is encouraged  Normal daily activities including climbing steps are okay  Do not engage in strenuous activity ( sit-ups or crutches) or contact sports for 4-6 weeks post-operatively    Return to Work:   Okay to return to work when you feel well if you desire  Diet:   You may return to your normal healthy diet  Wound Care: Your wound is closed with dissolvable stitches and glue  It is okay to shower  Wash incision gently with soap and water and pat dry  Do not soak incisions in bath water or swim for two weeks  Do not apply any creams or ointments  Pain Medication:   Please take as directed if needed  May use Advil or Motrin in addition  Recall, the pain medicine and anesthesia is associated with constipation  No driving while taking narcotic pain medications  Other: It is normal to developed a healing ridge / firm incision after surgery  This is your body making scar tissue  It is a good sign  Constipation is very common after general anesthesia  Please use milk of magnesia as needed in order to help prevent constipation  It is normal to get bruising after surgery  If you have questions after discharge please call the office      If you have increased pain, fever >101 5, increased drainage, redness or a bad smell at your surgery site, please call us immediately or come directly to the Emergency Room

## 2022-04-20 ENCOUNTER — ANESTHESIA (OUTPATIENT)
Dept: PERIOP | Facility: HOSPITAL | Age: 77
End: 2022-04-20
Payer: COMMERCIAL

## 2022-04-20 ENCOUNTER — HOSPITAL ENCOUNTER (OUTPATIENT)
Facility: HOSPITAL | Age: 77
Setting detail: OUTPATIENT SURGERY
Discharge: HOME | End: 2022-04-20
Attending: SURGERY | Admitting: SURGERY
Payer: COMMERCIAL

## 2022-04-20 VITALS
SYSTOLIC BLOOD PRESSURE: 152 MMHG | RESPIRATION RATE: 18 BRPM | HEART RATE: 67 BPM | OXYGEN SATURATION: 98 % | TEMPERATURE: 97.2 F | DIASTOLIC BLOOD PRESSURE: 67 MMHG

## 2022-04-20 DIAGNOSIS — K85.10 GALLSTONE PANCREATITIS: ICD-10-CM

## 2022-04-20 DIAGNOSIS — K80.20 GALL STONES: Primary | ICD-10-CM

## 2022-04-20 PROCEDURE — 88304 TISSUE EXAM BY PATHOLOGIST: CPT | Performed by: PATHOLOGY

## 2022-04-20 PROCEDURE — 47562 LAPAROSCOPIC CHOLECYSTECTOMY: CPT | Performed by: SURGERY

## 2022-04-20 PROCEDURE — NC001 PR NO CHARGE: Performed by: SURGERY

## 2022-04-20 RX ORDER — ONDANSETRON 2 MG/ML
4 INJECTION INTRAMUSCULAR; INTRAVENOUS ONCE AS NEEDED
Status: DISCONTINUED | OUTPATIENT
Start: 2022-04-20 | End: 2022-04-20 | Stop reason: HOSPADM

## 2022-04-20 RX ORDER — ROCURONIUM BROMIDE 10 MG/ML
INJECTION, SOLUTION INTRAVENOUS AS NEEDED
Status: DISCONTINUED | OUTPATIENT
Start: 2022-04-20 | End: 2022-04-20

## 2022-04-20 RX ORDER — PROPOFOL 10 MG/ML
INJECTION, EMULSION INTRAVENOUS AS NEEDED
Status: DISCONTINUED | OUTPATIENT
Start: 2022-04-20 | End: 2022-04-20

## 2022-04-20 RX ORDER — SODIUM CHLORIDE, SODIUM LACTATE, POTASSIUM CHLORIDE, CALCIUM CHLORIDE 600; 310; 30; 20 MG/100ML; MG/100ML; MG/100ML; MG/100ML
INJECTION, SOLUTION INTRAVENOUS CONTINUOUS PRN
Status: DISCONTINUED | OUTPATIENT
Start: 2022-04-20 | End: 2022-04-20

## 2022-04-20 RX ORDER — CEFAZOLIN SODIUM 1 G/50ML
1000 SOLUTION INTRAVENOUS ONCE
Status: DISCONTINUED | OUTPATIENT
Start: 2022-04-20 | End: 2022-04-20 | Stop reason: HOSPADM

## 2022-04-20 RX ORDER — CEFAZOLIN SODIUM 1 G/50ML
SOLUTION INTRAVENOUS AS NEEDED
Status: DISCONTINUED | OUTPATIENT
Start: 2022-04-20 | End: 2022-04-20

## 2022-04-20 RX ORDER — FENTANYL CITRATE 50 UG/ML
INJECTION, SOLUTION INTRAMUSCULAR; INTRAVENOUS AS NEEDED
Status: DISCONTINUED | OUTPATIENT
Start: 2022-04-20 | End: 2022-04-20

## 2022-04-20 RX ORDER — ONDANSETRON 2 MG/ML
4 INJECTION INTRAMUSCULAR; INTRAVENOUS EVERY 4 HOURS PRN
Status: CANCELLED | OUTPATIENT
Start: 2022-04-20

## 2022-04-20 RX ORDER — BUPIVACAINE HYDROCHLORIDE AND EPINEPHRINE 2.5; 5 MG/ML; UG/ML
INJECTION, SOLUTION EPIDURAL; INFILTRATION; INTRACAUDAL; PERINEURAL AS NEEDED
Status: DISCONTINUED | OUTPATIENT
Start: 2022-04-20 | End: 2022-04-20 | Stop reason: HOSPADM

## 2022-04-20 RX ORDER — HYDROMORPHONE HCL/PF 1 MG/ML
0.5 SYRINGE (ML) INJECTION
Status: DISCONTINUED | OUTPATIENT
Start: 2022-04-20 | End: 2022-04-20 | Stop reason: HOSPADM

## 2022-04-20 RX ORDER — ONDANSETRON 2 MG/ML
4 INJECTION INTRAMUSCULAR; INTRAVENOUS EVERY 6 HOURS PRN
Status: DISCONTINUED | OUTPATIENT
Start: 2022-04-20 | End: 2022-04-20 | Stop reason: HOSPADM

## 2022-04-20 RX ORDER — MEPERIDINE HYDROCHLORIDE 25 MG/ML
12.5 INJECTION INTRAMUSCULAR; INTRAVENOUS; SUBCUTANEOUS
Status: DISCONTINUED | OUTPATIENT
Start: 2022-04-20 | End: 2022-04-20 | Stop reason: HOSPADM

## 2022-04-20 RX ORDER — OXYCODONE HYDROCHLORIDE 5 MG/1
2.5 TABLET ORAL EVERY 4 HOURS PRN
Status: DISCONTINUED | OUTPATIENT
Start: 2022-04-20 | End: 2022-04-20 | Stop reason: HOSPADM

## 2022-04-20 RX ORDER — FENTANYL CITRATE/PF 50 MCG/ML
25 SYRINGE (ML) INJECTION
Status: DISCONTINUED | OUTPATIENT
Start: 2022-04-20 | End: 2022-04-20 | Stop reason: HOSPADM

## 2022-04-20 RX ORDER — ACETAMINOPHEN 325 MG/1
650 TABLET ORAL EVERY 4 HOURS PRN
Status: CANCELLED | OUTPATIENT
Start: 2022-04-20

## 2022-04-20 RX ORDER — SODIUM CHLORIDE 9 MG/ML
INJECTION, SOLUTION INTRAVENOUS AS NEEDED
Status: DISCONTINUED | OUTPATIENT
Start: 2022-04-20 | End: 2022-04-20 | Stop reason: HOSPADM

## 2022-04-20 RX ORDER — ACETAMINOPHEN 325 MG/1
975 TABLET ORAL EVERY 6 HOURS SCHEDULED
Status: DISCONTINUED | OUTPATIENT
Start: 2022-04-20 | End: 2022-04-20 | Stop reason: HOSPADM

## 2022-04-20 RX ORDER — DEXAMETHASONE SODIUM PHOSPHATE 10 MG/ML
INJECTION, SOLUTION INTRAMUSCULAR; INTRAVENOUS AS NEEDED
Status: DISCONTINUED | OUTPATIENT
Start: 2022-04-20 | End: 2022-04-20

## 2022-04-20 RX ORDER — ONDANSETRON 2 MG/ML
INJECTION INTRAMUSCULAR; INTRAVENOUS AS NEEDED
Status: DISCONTINUED | OUTPATIENT
Start: 2022-04-20 | End: 2022-04-20

## 2022-04-20 RX ORDER — HYDROCODONE BITARTRATE AND ACETAMINOPHEN 5; 325 MG/1; MG/1
1 TABLET ORAL EVERY 6 HOURS PRN
Status: CANCELLED | OUTPATIENT
Start: 2022-04-20

## 2022-04-20 RX ORDER — LIDOCAINE HYDROCHLORIDE 10 MG/ML
INJECTION, SOLUTION EPIDURAL; INFILTRATION; INTRACAUDAL; PERINEURAL AS NEEDED
Status: DISCONTINUED | OUTPATIENT
Start: 2022-04-20 | End: 2022-04-20

## 2022-04-20 RX ORDER — OXYCODONE HYDROCHLORIDE AND ACETAMINOPHEN 5; 325 MG/1; MG/1
1 TABLET ORAL EVERY 4 HOURS PRN
Qty: 10 TABLET | Refills: 0 | Status: SHIPPED | OUTPATIENT
Start: 2022-04-20

## 2022-04-20 RX ORDER — HYDROMORPHONE HCL/PF 1 MG/ML
0.5 SYRINGE (ML) INJECTION
Status: CANCELLED | OUTPATIENT
Start: 2022-04-20

## 2022-04-20 RX ORDER — PROMETHAZINE HYDROCHLORIDE 25 MG/ML
12.5 INJECTION, SOLUTION INTRAMUSCULAR; INTRAVENOUS ONCE AS NEEDED
Status: DISCONTINUED | OUTPATIENT
Start: 2022-04-20 | End: 2022-04-20 | Stop reason: HOSPADM

## 2022-04-20 RX ORDER — KETOROLAC TROMETHAMINE 30 MG/ML
INJECTION, SOLUTION INTRAMUSCULAR; INTRAVENOUS AS NEEDED
Status: DISCONTINUED | OUTPATIENT
Start: 2022-04-20 | End: 2022-04-20

## 2022-04-20 RX ORDER — MAGNESIUM HYDROXIDE 1200 MG/15ML
LIQUID ORAL AS NEEDED
Status: DISCONTINUED | OUTPATIENT
Start: 2022-04-20 | End: 2022-04-20 | Stop reason: HOSPADM

## 2022-04-20 RX ORDER — OXYCODONE HYDROCHLORIDE 5 MG/1
5 TABLET ORAL EVERY 4 HOURS PRN
Status: DISCONTINUED | OUTPATIENT
Start: 2022-04-20 | End: 2022-04-20 | Stop reason: HOSPADM

## 2022-04-20 RX ORDER — ALBUTEROL SULFATE 2.5 MG/3ML
2.5 SOLUTION RESPIRATORY (INHALATION) ONCE AS NEEDED
Status: DISCONTINUED | OUTPATIENT
Start: 2022-04-20 | End: 2022-04-20 | Stop reason: HOSPADM

## 2022-04-20 RX ADMIN — ROCURONIUM BROMIDE 40 MG: 10 SOLUTION INTRAVENOUS at 07:29

## 2022-04-20 RX ADMIN — OXYCODONE HYDROCHLORIDE 5 MG: 5 TABLET ORAL at 09:24

## 2022-04-20 RX ADMIN — SODIUM CHLORIDE, SODIUM LACTATE, POTASSIUM CHLORIDE, AND CALCIUM CHLORIDE: .6; .31; .03; .02 INJECTION, SOLUTION INTRAVENOUS at 07:07

## 2022-04-20 RX ADMIN — PROPOFOL 150 MG: 10 INJECTION, EMULSION INTRAVENOUS at 07:29

## 2022-04-20 RX ADMIN — KETOROLAC TROMETHAMINE 15 MG: 30 INJECTION, SOLUTION INTRAMUSCULAR at 08:01

## 2022-04-20 RX ADMIN — SUGAMMADEX 100 MG: 100 INJECTION, SOLUTION INTRAVENOUS at 08:14

## 2022-04-20 RX ADMIN — LIDOCAINE HYDROCHLORIDE 50 MG: 10 INJECTION, SOLUTION EPIDURAL; INFILTRATION; INTRACAUDAL at 07:29

## 2022-04-20 RX ADMIN — FENTANYL CITRATE 100 MCG: 50 INJECTION, SOLUTION INTRAMUSCULAR; INTRAVENOUS at 07:29

## 2022-04-20 RX ADMIN — ONDANSETRON 4 MG: 2 INJECTION INTRAMUSCULAR; INTRAVENOUS at 08:01

## 2022-04-20 RX ADMIN — DEXAMETHASONE SODIUM PHOSPHATE 10 MG: 10 INJECTION, SOLUTION INTRAMUSCULAR; INTRAVENOUS at 07:35

## 2022-04-20 RX ADMIN — CEFAZOLIN SODIUM 1000 MG: 1 SOLUTION INTRAVENOUS at 07:26

## 2022-04-20 NOTE — OP NOTE
OPERATIVE REPORT  PATIENT NAME: Maria Del Rosario Gan    :  1945  MRN: [id number]  Pt Location: [facility] OR ROOM 01    SURGERY DATE: 2022    Surgeon(s) and Role:     * Alona Moya MD - Primary     * Roseanna Rocha MD - Assisting    Preop Diagnosis:  Gallstone pancreatitis [K85 10]    Post-Op Diagnosis Codes:     * Gallstone pancreatitis [K85 10]    Procedure(s) (LRB):  CHOLECYSTECTOMY LAPAROSCOPIC (N/A)    Specimen(s):  ID Type Source Tests Collected by Time Destination   1 :  Tissue Gallbladder TISSUE EXAM Alona Moya MD 2022  7:52 AM        Estimated Blood Loss:   Minimal    Drains:  * No LDAs found *    Anesthesia Type:   General    Operative Indications:  Gallstone pancreatitis [K85 10]    Independent, nonsmoker, ASA 2, wound class 2, BMI 19, weight 105, height 62     CARDIO   (+) Primary hypertension       GI/HEPATIC   (+) Acute pancreatitis   (+) 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   (+) Gastroesophageal reflux disease            Complications:   None    Procedure and Technique:  Maria Del Rosario Gan is a 76 y o  female was brought into the operative suite and identified visually and by arm band  The patient was placed in the supine position  Careful attention towards positioning of extremities was completed  After sterile prep and drape a timeout was completed  Athrombic pumps in place  Antibiotics provided  After instillation of local analgesia an incision was made at the umbilicus   With blunt dissection the peritoneum was identified  This was pulled upward using Kocher clamps  An incision was made  Hemostat was used to bluntly puncture the peritoneum  Intra-abdominal location was verified  A trocar was then inserted  CO2 was then insufflated with a back pressure of 15  After Marcaine instillation at each additional site, additional trochars are placed under direct vision into the upper aspect of the abdomen      Laparoscopic visualization revealed a normal liver and normal stomach  No excess peritoneal fluid  The gallbladder was pulled with traction inferiorly, and the liver was pushed superior  A dome down technique was completed using electrocautery  This technique carried down to the level of Kaur's pouch  Careful attention was made towards location of the right hepatic artery, cystic artery, common bile duct, right hepatic duct and the cystic duct  Careful attention was made towards the critical anatomy at this region  The cystic duct was identified inserting into the base of the gallbladder  Cystic artery was identified also inserting into the base of the gallbladder  The cystic duct was then clipped ×3 and divided  The cystic artery was clipped ×3 and divided  The gallbladder was then removed from the liver bed with additional electrocautery  The area was copiously irrigated  Hemostasis was assured  The gallbladder was placed into an Endo-Catch bag, and was then removed through the umbilical incision  Fascia was closed with 0 Vicryl suture  The subcutaneous components were irrigated  The subcuticular incisions were then closed  Histacryl was then applied  The patient was awakened from general anesthesia and transferred to the recovery room in stable condition  Sponge and instrument count correct ×2  A postoperative deep briefing was completed       I was present for the entire procedure    Patient Disposition:  PACU       SIGNATURE: Shelby Cruz MD  DATE: April 20, 2022  TIME: 8:29 AM

## 2022-04-20 NOTE — PROGRESS NOTES
[address] 76 y o  female MRN: [id number]  Unit/Bed#: OR [place] Encounter: [id number]        ASSESSMENT: hx of pancreatitis in the past,  Hx gallstone pancreatitis    PLAN: ceci reynolds        Review of Systems  Constitutional:  Denies fever or chills   Eyes:  Denies change in visual acuity   HENT:  Denies nasal congestion or sore throat   Respiratory:  Denies cough or shortness of breath   Cardiovascular:  Denies chest pain or edema   GI:  Denies  nausea, vomiting, bloody stools or diarrhea   Musculoskeletal:  Denies back pain or joint pain   Integument:  Denies rash   Neurologic:  Denies headache, focal weakness or sensory changes   Endocrine:  Denies polyuria or polydipsia   Lymphatic:  Denies swollen glands   Psychiatric:  Denies depression or anxiety     Historical Information   Past Medical History:   Diagnosis Date    Basal cell carcinoma     back    Hypertension     no medications currently    Pancreatitis      Past Surgical History:   Procedure Laterality Date    TUBAL LIGATION      WISDOM TOOTH EXTRACTION       Social History   Social History     Substance and Sexual Activity   Alcohol Use Yes    Comment: rarely     Social History     Substance and Sexual Activity   Drug Use Not on file     Social History     Tobacco Use   Smoking Status Former Smoker    Types: Cigarettes    Quit date: 2008    Years since quittin 3   Smokeless Tobacco Never Used     Family History   Problem Relation Age of Onset    Lung cancer Mother     Heart attack Father        Meds/Allergies     Medications Prior to Admission   Medication    esomeprazole (NexIUM) 40 MG capsule    fluocinolone acetonide (DERMOTIC) 0 01 % otic oil    mometasone (ELOCON) 0 1 % lotion    Omega-3 Fatty Acids (FISH OIL PO)    omeprazole (PriLOSEC) 20 mg delayed release capsule     Current Facility-Administered Medications   Medication Dose Route Frequency    ceFAZolin (ANCEF) IVPB (premix in dextrose) 1,000 mg 50 mL  1,000 mg Intravenous Once       Allergies   Allergen Reactions    Pollen Extract Other (See Comments)     Runny nose & congestion       Objective     Blood pressure 162/72, pulse 69, temperature (!) 97 2 °F (36 2 °C), temperature source Temporal, resp  rate 20, SpO2 100 %  No intake or output data in the 24 hours ending 04/20/22 0655    PHYSICAL EXAM  General appearance: alert and oriented, in no acute distress  Lungs: clear to auscultation bilaterally  Heart: regular rate and rhythm, S1, S2 normal, no murmur, click, rub or gallop  Abdomen: soft, non-tender; bowel sounds normal; no masses,  no organomegaly  Skin: Skin color, texture, turgor normal  No rashes or lesions    Lab Results:   No visits with results within 1 Day(s) from this visit     Latest known visit with results is:   Admission on 03/06/2022, Discharged on 03/08/2022   Component Date Value    WBC 03/06/2022 16 75*    RBC 03/06/2022 4 68     Hemoglobin 03/06/2022 14 9     Hematocrit 03/06/2022 43 8     MCV 03/06/2022 94     MCH 03/06/2022 31 8     MCHC 03/06/2022 34 0     RDW 03/06/2022 13 3     MPV 03/06/2022 9 4     Platelets 78/17/3837 293     Sodium 03/06/2022 138     Potassium 03/06/2022 3 8     Chloride 03/06/2022 101     CO2 03/06/2022 26     ANION GAP 03/06/2022 11     BUN 03/06/2022 10     Creatinine 03/06/2022 0 80     Glucose 03/06/2022 140     Calcium 03/06/2022 9 2     AST 03/06/2022 46*    ALT 03/06/2022 28     Alkaline Phosphatase 03/06/2022 116     Total Protein 03/06/2022 7 5     Albumin 03/06/2022 4 0     Total Bilirubin 03/06/2022 0 75     eGFR 03/06/2022 70     Lipase 03/06/2022 29,464*    LACTIC ACID 03/06/2022 2 0     hs TnI 0hr 03/06/2022 2     D-Dimer, Quant 03/06/2022 0 75*    Protime 03/06/2022 12 8     INR 03/06/2022 0 96     PTT 03/06/2022 23     Segmented % 03/06/2022 83*    Bands % 03/06/2022 10*    Lymphocytes % 03/06/2022 3*    Monocytes % 03/06/2022 4     Eosinophils, % 03/06/2022 0     Basophils % 03/06/2022 0     Absolute Neutrophils 03/06/2022 15 58*    Lymphocytes Absolute 03/06/2022 0 50*    Monocytes Absolute 03/06/2022 0 67     Eosinophils Absolute 03/06/2022 0 00     Basophils Absolute 03/06/2022 0 00     RBC Morphology 03/06/2022 Normal     Platelet Estimate 93/77/6922 Adequate     hs TnI 2hr 03/06/2022 2     Delta 2hr hsTnI 03/06/2022 0     Color, UA 03/06/2022 Yellow     Clarity, UA 03/06/2022 Clear     pH, UA 03/06/2022 7 0     Leukocytes, UA 03/06/2022 Negative     Nitrite, UA 03/06/2022 Negative     Protein, UA 03/06/2022 Negative     Glucose, UA 03/06/2022 Negative     Ketones, UA 03/06/2022 Trace*    Urobilinogen, UA 03/06/2022 0 2     Bilirubin, UA 03/06/2022 Negative     Blood, UA 03/06/2022 Negative     Specific Gravity, UA 03/06/2022 1 015     TSH 3RD GENERATON 03/06/2022 0 905     Ventricular Rate 03/06/2022 83     Atrial Rate 03/06/2022 83     CO Interval 03/06/2022 144     QRSD Interval 03/06/2022 78     QT Interval 03/06/2022 362     QTC Interval 03/06/2022 425     P Axis 03/06/2022 80     QRS Axis 03/06/2022 66     T Wave Axis 03/06/2022 76     Sodium 03/07/2022 143     Potassium 03/07/2022 3 7     Chloride 03/07/2022 109*    CO2 03/07/2022 26     ANION GAP 03/07/2022 8     BUN 03/07/2022 11     Creatinine 03/07/2022 0 63     Glucose 03/07/2022 98     Calcium 03/07/2022 8 1*    Corrected Calcium 03/07/2022 9 4     AST 03/07/2022 41     ALT 03/07/2022 38     Alkaline Phosphatase 03/07/2022 73     Total Protein 03/07/2022 5 4*    Albumin 03/07/2022 2 4*    Total Bilirubin 03/07/2022 0 69     eGFR 03/07/2022 85     WBC 03/07/2022 10 02     RBC 03/07/2022 3 54*    Hemoglobin 03/07/2022 11 4*    Hematocrit 03/07/2022 33 2*    MCV 03/07/2022 94     MCH 03/07/2022 31 6     MCHC 03/07/2022 33 7     RDW 03/07/2022 13 7     MPV 03/07/2022 9 5     Platelets 33/02/9272 215     nRBC 03/07/2022 0  Neutrophils Relative 03/07/2022 80*    Immat GRANS % 03/07/2022 0     Lymphocytes Relative 03/07/2022 14     Monocytes Relative 03/07/2022 5     Eosinophils Relative 03/07/2022 1     Basophils Relative 03/07/2022 0     Neutrophils Absolute 03/07/2022 8 03*    Immature Grans Absolute 03/07/2022 0 03     Lymphocytes Absolute 03/07/2022 1 39     Monocytes Absolute 03/07/2022 0 47     Eosinophils Absolute 03/07/2022 0 06     Basophils Absolute 03/07/2022 0 04     Magnesium 03/07/2022 1 9     Lipase 03/07/2022 1,413*    Cholesterol 03/07/2022 202*    Triglycerides 03/07/2022 84     HDL, Direct 03/07/2022 81     LDL Calculated 03/07/2022 104*    Non-HDL-Chol (CHOL-HDL) 03/07/2022 121     Lipase 03/08/2022 117     WBC 03/08/2022 6 52     RBC 03/08/2022 3 41*    Hemoglobin 03/08/2022 10 3*    Hematocrit 03/08/2022 31 3*    MCV 03/08/2022 92     MCH 03/08/2022 30 2     MCHC 03/08/2022 32 9     RDW 03/08/2022 18 6*    MPV 03/08/2022 9 6     Platelets 07/91/5348 413*    nRBC 03/08/2022 0     Neutrophils Relative 03/08/2022 70     Immat GRANS % 03/08/2022 1     Lymphocytes Relative 03/08/2022 12*    Monocytes Relative 03/08/2022 13*    Eosinophils Relative 03/08/2022 3     Basophils Relative 03/08/2022 1     Neutrophils Absolute 03/08/2022 4 54     Immature Grans Absolute 03/08/2022 0 07     Lymphocytes Absolute 03/08/2022 0 78     Monocytes Absolute 03/08/2022 0 84     Eosinophils Absolute 03/08/2022 0 20     Basophils Absolute 03/08/2022 0 09     Sodium 03/08/2022 139     Potassium 03/08/2022 4 3     Chloride 03/08/2022 107     CO2 03/08/2022 25     ANION GAP 03/08/2022 7     BUN 03/08/2022 24     Creatinine 03/08/2022 1 25     Glucose 03/08/2022 100     Calcium 03/08/2022 8 9     Corrected Calcium 03/08/2022 10 2*    AST 03/08/2022 31     ALT 03/08/2022 17     Alkaline Phosphatase 03/08/2022 221*    Total Protein 03/08/2022 7 9     Albumin 03/08/2022 2 4*    Total Bilirubin 03/08/2022 0 41     eGFR 03/08/2022 41     Triglycerides 03/08/2022 153*     Imaging Studies: I have personally reviewed pertinent reports  No results found  Counseling / Coordination of Care  Total time spent today  15 minutes  Greater than 50% of total time was spent with the patient and / or family counseling and / or coordination of care

## 2022-04-20 NOTE — ANESTHESIA PREPROCEDURE EVALUATION
Procedure:  CHOLECYSTECTOMY LAPAROSCOPIC (N/A Abdomen)    Relevant Problems   CARDIO   (+) Primary hypertension      GI/HEPATIC   (+) Acute pancreatitis   (+) 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   (+) Gastroesophageal reflux disease        Physical Exam    Airway    Mallampati score: II  TM Distance: >3 FB  Neck ROM: full     Dental       Cardiovascular  Cardiovascular exam normal    Pulmonary  Pulmonary exam normal     Other Findings        Anesthesia Plan  ASA Score- 2     Anesthesia Type- general with ASA Monitors  Additional Monitors:   Airway Plan: ETT  Plan Factors-Exercise tolerance (METS): >4 METS  Chart reviewed  EKG reviewed  Imaging results reviewed  Existing labs reviewed  Patient summary reviewed  Patient is not a current smoker  Patient did not smoke on day of surgery  Obstructive sleep apnea risk education given perioperatively  Induction- intravenous  Postoperative Plan- Plan for postoperative opioid use  Planned trial extubation    Informed Consent- Anesthetic plan and risks discussed with patient  I personally reviewed this patient with the CRNA  Discussed and agreed on the Anesthesia Plan with the CRNA  Viviana Console

## 2022-04-20 NOTE — ANESTHESIA POSTPROCEDURE EVALUATION
Post-Op Assessment Note    CV Status:  Stable    Pain management: adequate     Mental Status:  Alert and awake   Hydration Status:  Euvolemic   PONV Controlled:  Controlled   Airway Patency:  Patent      Post Op Vitals Reviewed: Yes      Staff: CRNA         No complications documented      BP   139/72   Temp  98   Pulse  78   Resp   18   SpO2   99

## 2022-05-03 ENCOUNTER — OFFICE VISIT (OUTPATIENT)
Dept: SURGERY | Facility: CLINIC | Age: 77
End: 2022-05-03

## 2022-05-03 DIAGNOSIS — Z48.89 POSTOPERATIVE VISIT: Primary | ICD-10-CM

## 2022-05-03 PROCEDURE — 99024 POSTOP FOLLOW-UP VISIT: CPT | Performed by: SURGERY

## 2022-05-03 NOTE — PROGRESS NOTES
Assessment/Plan:  Patient is status post laparoscopic cholecystectomy  She feels quite well  She will 1st no complaints  Incisions clean healing nicely  No evidence for infection  All questions were answered  Activity instructions provided  There are no diagnoses linked to this encounter  Pathology: Reviewed with patient, all questions answered  Postoperative restrictions reviewed  All questions answered  ______________________________________________________  HPI: Patient presents post operatively  Laparoscopic cholecystectomy 4/20/2022  Final Diagnosis  A  Gallbladder:  - Chronic cholecystitis with cholelithiasis  - Negative for malignancy  ROS:  General ROS: negative for - chills, fatigue, fever or night sweats, weight loss  Respiratory ROS: no cough, shortness of breath, or wheezing  Cardiovascular ROS: no chest pain or dyspnea on exertion  Genito-Urinary ROS: no dysuria, trouble voiding, or hematuria  Musculoskeletal ROS: negative for - gait disturbance, joint pain or muscle pain  Neurological ROS: no TIA or stroke symptoms  GI ROS: see HPI  Skin ROS: no new rashes or lesions   Lymphatic ROS: no new adenopathy noted by pt     GYN ROS: see HPI, no new GYN history or bleeding noted  Psy ROS: no new mental or behavioral disturbances         Patient Active Problem List   Diagnosis    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    Primary hypertension    Gastroesophageal reflux disease    Acute pancreatitis    Gall stones       Allergies:  Pollen extract      Current Outpatient Medications:     esomeprazole (NexIUM) 40 MG capsule, Take 40 mg by mouth every morning before breakfast, Disp: , Rfl:     fluocinolone acetonide (DERMOTIC) 0 01 % otic oil, Administer 2 drops into both ears daily at bedtime as needed (when itchy) for up to 180 days, Disp: 1 Bottle, Rfl: 1    mometasone (ELOCON) 0 1 % lotion, One drop affected ear canal daily at bedtime when necessary itching, Disp: 60 mL, Rfl: 0    Omega-3 Fatty Acids (FISH OIL PO), Take 1 g by mouth, Disp: , Rfl:     omeprazole (PriLOSEC) 20 mg delayed release capsule, Take 20 mg by mouth daily, Disp: , Rfl:     oxyCODONE-acetaminophen (PERCOCET) 5-325 mg per tablet, Take 1 tablet by mouth every 4 (four) hours as needed for moderate pain for up to 10 doses Max Daily Amount: 6 tablets, Disp: 10 tablet, Rfl: 0    Past Medical History:   Diagnosis Date    Basal cell carcinoma     back    Hypertension     no medications currently    Pancreatitis        Past Surgical History:   Procedure Laterality Date    [place] LAP,CHOLECYSTECTOMY N/A 4/20/2022    Procedure: CHOLECYSTECTOMY LAPAROSCOPIC;  Surgeon: Josselyn Flannery MD;  Location: [facility];  Service: General    TUBAL LIGATION      WISDOM TOOTH EXTRACTION         Family History   Problem Relation Age of Onset    Lung cancer Mother     Heart attack Father         reports that she quit smoking about 14 years ago  Her smoking use included cigarettes  She has never used smokeless tobacco  She reports current alcohol use  PHYSICAL EXAM    There were no vitals taken for this visit      General: normal, cooperative, no distress  Abdominal: soft, nondistended or nontender  Incision: clean, dry, and intact and healing well      Josselyn Flannery MD    Date: 5/3/2022 Time: 1:15 PM 59

## 2022-08-24 ENCOUNTER — OFFICE VISIT (OUTPATIENT)
Dept: URGENT CARE | Facility: MEDICAL CENTER | Age: 77
End: 2022-08-24
Payer: COMMERCIAL

## 2022-08-24 VITALS
BODY MASS INDEX: 19.32 KG/M2 | TEMPERATURE: 99.7 F | RESPIRATION RATE: 18 BRPM | HEIGHT: 62 IN | OXYGEN SATURATION: 100 % | HEART RATE: 95 BPM | WEIGHT: 105 LBS

## 2022-08-24 DIAGNOSIS — Z20.822 COUGH WITH EXPOSURE TO COVID-19 VIRUS: Primary | ICD-10-CM

## 2022-08-24 DIAGNOSIS — R05.8 COUGH WITH EXPOSURE TO COVID-19 VIRUS: Primary | ICD-10-CM

## 2022-08-24 PROCEDURE — 99213 OFFICE O/P EST LOW 20 MIN: CPT | Performed by: PHYSICIAN ASSISTANT

## 2022-08-24 PROCEDURE — U0003 INFECTIOUS AGENT DETECTION BY NUCLEIC ACID (DNA OR RNA); SEVERE ACUTE RESPIRATORY SYNDROME CORONAVIRUS 2 (SARS-COV-2) (CORONAVIRUS DISEASE [COVID-19]), AMPLIFIED PROBE TECHNIQUE, MAKING USE OF HIGH THROUGHPUT TECHNOLOGIES AS DESCRIBED BY CMS-2020-01-R: HCPCS | Performed by: PHYSICIAN ASSISTANT

## 2022-08-24 PROCEDURE — U0005 INFEC AGEN DETEC AMPLI PROBE: HCPCS | Performed by: PHYSICIAN ASSISTANT

## 2022-08-24 NOTE — PROGRESS NOTES
[facility]        NAME: Zabrina Goldsmith is a 76 y o  female  : 1945    MRN: [id number]  DATE: 2022  TIME: 11:34 AM    Assessment and Plan   Cough with exposure to COVID-19 virus [R05 8, Z20 822]  1  Cough with exposure to COVID-19 virus  COVID Only -Office Collect         Patient Instructions     Cough-exposure to COVID  COVID test sent  Follow up with PCP in 3-5 days  Proceed to  ER if symptoms worsen  Chief Complaint     Chief Complaint   Patient presents with    Cough     Patient has congestion, sore throat, headache; at home positive covid test today; needs PCR test for work         History of Present Illness       66-year-old female who presents complaining of cough, body aches, chills, fever T-max 100 2° x2 days  Patient states that she tested at home for COVID-19 which came back positive but needs a PCR for work  Patient states that she is vaccinated and she is not interested in Paxil of it at this moment  Denies chest pain, shortness off breath      Review of Systems   Review of Systems   Constitutional: Positive for chills and fever  Negative for activity change, appetite change, diaphoresis and fatigue  HENT: Positive for congestion and rhinorrhea  Negative for ear discharge, ear pain, facial swelling, hearing loss, mouth sores, nosebleeds, postnasal drip, sinus pressure, sinus pain, sneezing, sore throat and voice change  Respiratory: Positive for cough  Negative for apnea, choking, chest tightness, shortness of breath, wheezing and stridor  Cardiovascular: Negative            Current Medications       Current Outpatient Medications:     esomeprazole (NexIUM) 40 MG capsule, Take 40 mg by mouth every morning before breakfast, Disp: , Rfl:     fluocinolone acetonide (DERMOTIC) 0 01 % otic oil, Administer 2 drops into both ears daily at bedtime as needed (when itchy) for up to 180 days, Disp: 1 Bottle, Rfl: 1    mometasone (ELOCON) 0 1 % lotion, One drop affected ear canal daily at bedtime when necessary itching, Disp: 60 mL, Rfl: 0    Omega-3 Fatty Acids (FISH OIL PO), Take 1 g by mouth, Disp: , Rfl:     omeprazole (PriLOSEC) 20 mg delayed release capsule, Take 20 mg by mouth daily, Disp: , Rfl:     oxyCODONE-acetaminophen (PERCOCET) 5-325 mg per tablet, Take 1 tablet by mouth every 4 (four) hours as needed for moderate pain for up to 10 doses Max Daily Amount: 6 tablets, Disp: 10 tablet, Rfl: 0    Current Allergies     Allergies as of 08/24/2022 - Reviewed 08/24/2022   Allergen Reaction Noted    Pollen extract Other (See Comments) 08/11/2016            The following portions of the patient's history were reviewed and updated as appropriate: allergies, current medications, past family history, past medical history, past social history, past surgical history and problem list      Past Medical History:   Diagnosis Date    Basal cell carcinoma     back    Hypertension     no medications currently    Pancreatitis        Past Surgical History:   Procedure Laterality Date    GA LAP,CHOLECYSTECTOMY N/A 4/20/2022    Procedure: CHOLECYSTECTOMY LAPAROSCOPIC;  Surgeon: Salena Ring MD;  Location[facility];  Service: General    TUBAL LIGATION      WISDOM TOOTH EXTRACTION         Family History   Problem Relation Age of Onset    Lung cancer Mother     Heart attack Father          Medications have been verified  Objective   Pulse 95   Temp 99 7 °F (37 6 °C) (Temporal)   Resp 18   Ht 5' 2" (1 575 m)   Wt 47 6 kg (105 lb)   SpO2 100%   BMI 19 20 kg/m²        Physical Exam     Physical Exam  Constitutional:       General: She is not in acute distress  Appearance: Normal appearance  She is well-developed  She is not diaphoretic  HENT:      Head: Normocephalic and atraumatic        Right Ear: Hearing, tympanic membrane, ear canal and external ear normal       Left Ear: Hearing, tympanic membrane, ear canal and external ear normal       Nose: Rhinorrhea present  Mouth/Throat:      Pharynx: Uvula midline  Cardiovascular:      Rate and Rhythm: Normal rate and regular rhythm  Heart sounds: Normal heart sounds  Pulmonary:      Effort: Pulmonary effort is normal  No respiratory distress  Breath sounds: Normal breath sounds  No stridor  No wheezing, rhonchi or rales  Chest:      Chest wall: No tenderness  Musculoskeletal:      Cervical back: Normal range of motion and neck supple  Lymphadenopathy:      Cervical: Cervical adenopathy present  Neurological:      Mental Status: She is alert

## 2022-08-24 NOTE — LETTER
August 24, 2022     Patient: Jacklyn Villatoro   YOB: 1945   Date of Visit: 8/24/2022       To Whom it May Concern:    Ty  was seen in my clinic on 8/24/2022  If Covid and flu tests are negative, patient may return to work when fever free for 24 hours without the use of a fever reducing agent  If covid or flu test is positive, patient may return to work 8/29/22 and when fever free for 24 hours without the use of a fever reducing agent  Upon return, the patient must then adhere to strict masking for an additional 5 days  If you have any questions or concerns, please don't hesitate to call  Sincerely,          [facility] [place]        CC: Audrey Tidwell   [address]

## 2022-08-25 LAB — SARS-COV-2 RNA RESP QL NAA+PROBE: POSITIVE

## 2022-08-26 ENCOUNTER — TELEPHONE (OUTPATIENT)
Dept: URGENT CARE | Facility: CLINIC | Age: 77
End: 2022-08-26

## (undated) DEVICE — TROCAR APPPLE 5MM EXTENDED LENGTH

## (undated) DEVICE — GLOVE SRG BIOGEL ECLIPSE 7

## (undated) DEVICE — TROCAR: Brand: KII FIOS FIRST ENTRY

## (undated) DEVICE — VIAL DECANTER

## (undated) DEVICE — PAD GROUNDING ADULT

## (undated) DEVICE — TOWEL SURG XR DETECT GREEN STRL RFD

## (undated) DEVICE — TISSUE RETRIEVAL SYSTEM: Brand: INZII RETRIEVAL SYSTEM

## (undated) DEVICE — LIGAMAX 5 MM ENDOSCOPIC MULTIPLE CLIP APPLIER: Brand: LIGAMAX

## (undated) DEVICE — PACK PBDS LAP CHOLE RF

## (undated) DEVICE — ADHESIVE SKIN HIGH VISCOSITY EXOFIN 1ML

## (undated) DEVICE — NEEDLE 22 G X 1 1/2 SAFETY

## (undated) DEVICE — INTENDED FOR TISSUE SEPARATION, AND OTHER PROCEDURES THAT REQUIRE A SHARP SURGICAL BLADE TO PUNCTURE OR CUT.: Brand: BARD-PARKER SAFETY BLADES SIZE 11, STERILE

## (undated) DEVICE — SUT MONOCRYL 4-0 PS-2 27 IN Y426H

## (undated) DEVICE — TUBING SMOKE EVAC W/FILTRATION DEVICE PLUMEPORT ACTIV

## (undated) DEVICE — UNDYED BRAIDED (POLYGLACTIN 910), SYNTHETIC ABSORBABLE SUTURE: Brand: COATED VICRYL

## (undated) DEVICE — ELECTRODE LAP J HOOK SPLIT STEM E-Z CLEAN 33CM -0021S

## (undated) DEVICE — STERILE SURGICAL LUBRICANT,  TUBE: Brand: SURGILUBE

## (undated) DEVICE — DRAPE EQUIPMENT RF WAND